# Patient Record
Sex: FEMALE | Race: WHITE | Employment: OTHER | URBAN - METROPOLITAN AREA
[De-identification: names, ages, dates, MRNs, and addresses within clinical notes are randomized per-mention and may not be internally consistent; named-entity substitution may affect disease eponyms.]

---

## 2017-01-15 ENCOUNTER — HOSPITAL ENCOUNTER (EMERGENCY)
Age: 82
Discharge: HOME | End: 2017-01-15
Attending: EMERGENCY MEDICINE
Payer: MEDICARE

## 2017-01-15 VITALS
DIASTOLIC BLOOD PRESSURE: 94 MMHG | RESPIRATION RATE: 15 BRPM | WEIGHT: 128 LBS | HEIGHT: 66 IN | HEART RATE: 78 BPM | SYSTOLIC BLOOD PRESSURE: 166 MMHG | TEMPERATURE: 98 F | BODY MASS INDEX: 20.57 KG/M2 | OXYGEN SATURATION: 94 %

## 2017-01-15 PROCEDURE — 99282 EMERGENCY DEPT VISIT SF MDM: CPT

## 2017-01-15 PROCEDURE — 90715 TDAP VACCINE 7 YRS/> IM: CPT | Performed by: PHYSICIAN ASSISTANT

## 2017-01-15 PROCEDURE — 74011250637 HC RX REV CODE- 250/637: Performed by: PHYSICIAN ASSISTANT

## 2017-01-15 PROCEDURE — 75810000293 HC SIMP/SUPERF WND  RPR

## 2017-01-15 PROCEDURE — 74011000250 HC RX REV CODE- 250: Performed by: PHYSICIAN ASSISTANT

## 2017-01-15 PROCEDURE — 77030002916 HC SUT ETHLN J&J -A

## 2017-01-15 PROCEDURE — 74011250636 HC RX REV CODE- 250/636: Performed by: PHYSICIAN ASSISTANT

## 2017-01-15 PROCEDURE — 90471 IMMUNIZATION ADMIN: CPT

## 2017-01-15 RX ORDER — LIDOCAINE HYDROCHLORIDE 20 MG/ML
10 INJECTION, SOLUTION EPIDURAL; INFILTRATION; INTRACAUDAL; PERINEURAL
Status: COMPLETED | OUTPATIENT
Start: 2017-01-15 | End: 2017-01-15

## 2017-01-15 RX ORDER — ERGOCALCIFEROL 1.25 MG/1
50000 CAPSULE ORAL
COMMUNITY
End: 2020-07-13

## 2017-01-15 RX ADMIN — BACITRACIN, NEOMYCIN, POLYMYXIN B 1 PACKET: 400; 3.5; 5 OINTMENT TOPICAL at 16:08

## 2017-01-15 RX ADMIN — LIDOCAINE HYDROCHLORIDE 200 MG: 20 INJECTION, SOLUTION EPIDURAL; INFILTRATION; INTRACAUDAL; PERINEURAL at 16:08

## 2017-01-15 RX ADMIN — TETANUS TOXOID, REDUCED DIPHTHERIA TOXOID AND ACELLULAR PERTUSSIS VACCINE, ADSORBED 0.5 ML: 5; 2.5; 8; 8; 2.5 SUSPENSION INTRAMUSCULAR at 16:08

## 2017-01-15 NOTE — DISCHARGE INSTRUCTIONS
We hope that we have addressed all of your medical concerns. The examination and treatment you received in the Emergency Department were for an emergent problem and were not intended as complete care. It is important that you follow up with your healthcare provider(s) for ongoing care. If your symptoms worsen or do not improve as expected, and you are unable to reach your usual health care provider(s), you should return to the Emergency Department. Today's healthcare is undergoing tremendous change, and patient satisfaction surveys are one of the many tools to assess the quality of medical care. You may receive a survey from the [employer] regarding your experience in the Emergency Department. I hope that your experience has been completely positive, particularly the medical care that I provided. As such, please participate in the survey; anything less than excellent does not meet my expectations or intentions. [address] [place] [address] and [facility] participate in nationally recognized quality of care measures. If your blood pressure is greater than 120/80, as reported below, we urge that you seek medical care to address the potential of high blood pressure, commonly known as hypertension. Hypertension can be hereditary or can be caused by certain medical conditions, pain, stress, or \"white coat syndrome. \"       Please make an appointment with your health care provider(s) for follow up of your Emergency Department visit. VITALS:   Patient Vitals for the past 8 hrs:   Temp Pulse Resp BP SpO2   01/15/17 1449 98 °F (36.7 °C) 78 15 (!) 166/94 94 %          Thank you for allowing us to provide you with medical care today. We realize that you have many choices for your emergency care needs. Please choose us in the future for any continued health care needs. Marilyn Patel  [address] [place] [address] [address]   Office: [phone]            No results found for this or any previous visit (from the past 24 hour(s)). No results found.

## 2017-01-15 NOTE — ED PROVIDER NOTES
HPI Comments: Patient presents with complaints of laceration to left lower leg after hitting the corner of the car door. Patient is unsure of her last tetanus vaccine. Patient is a 80 y.o. female presenting with skin laceration. The history is provided by the patient. Laceration    The incident occurred 1 to 2 hours ago. The laceration is located on the left leg. The laceration is 5 cm in size. The injury mechanism is a metal edge. Foreign body present: no. The pain is at a severity of 0/10. The pain is mild. The pain has been constant since onset. Pertinent negatives include no numbness, no tingling, no weakness, no loss of motion, no coolness and no discoloration. It is unknown when the patient last had a tetanus shot. Past Medical History:   Diagnosis Date    DDD (degenerative disc disease)     Duodenal ulcer 1976    FH: leukemia     Headache(784.0)     Hypercholesterolemia     Osteopenia     Osteoporosis     Rectal polyp     Subarachnoid hemorrhage ([address] 1983       Past Surgical History:   Procedure Laterality Date    Hx urological       bladder surgery    Hx hysterectomy       2/2 fibroids    Pr repair of rectocele      Hx cystocele repair      Hx mohs procedure      Hx orthopaedic       left foot surgery         Family History:   Problem Relation Age of Onset    Cancer Sister      leukemia    Cancer Brother      leukemia       Social History     Social History    Marital status:      Spouse name: N/A    Number of children: N/A    Years of education: N/A     Occupational History    Not on file.      Social History Main Topics    Smoking status: Former Smoker     Packs/day: 0.50     Years: 20.00     Quit date: 7/15/1976    Smokeless tobacco: Not on file    Alcohol use No    Drug use: Not on file    Sexual activity: Not on file     Other Topics Concern    Not on file     Social History Narrative         ALLERGIES: Review of patient's allergies indicates no known allergies. Review of Systems   Constitutional: Negative. HENT: Negative. Eyes: Negative. Respiratory: Negative. Cardiovascular: Negative. Gastrointestinal: Negative. Endocrine: Negative. Genitourinary: Negative. Musculoskeletal: Negative. Skin: Positive for wound. Allergic/Immunologic: Negative. Neurological: Negative. Negative for tingling, weakness and numbness. Hematological: Negative. Psychiatric/Behavioral: Negative. All other systems reviewed and are negative. Vitals:    01/15/17 1449   BP: (!) 166/94   Pulse: 78   Resp: 15   Temp: 98 °F (36.7 °C)   SpO2: 94%   Weight: 58.1 kg (128 lb)   Height: 5' 5.5\" (1.664 m)            Physical Exam   Constitutional: She is oriented to person, place, and time. She appears well-developed and well-nourished. HENT:   Head: Normocephalic and atraumatic. Right Ear: External ear normal.   Left Ear: External ear normal.   Mouth/Throat: Oropharynx is clear and moist. No oropharyngeal exudate. Eyes: Conjunctivae and EOM are normal. Pupils are equal, round, and reactive to light. Right eye exhibits no discharge. Left eye exhibits no discharge. No scleral icterus. Neck: Normal range of motion. No tracheal deviation present. No thyromegaly present. Cardiovascular: Normal rate, regular rhythm and normal heart sounds. No murmur heard. Pulmonary/Chest: Effort normal and breath sounds normal. No respiratory distress. She has no wheezes. She has no rales. She exhibits no tenderness. Abdominal: Soft. Bowel sounds are normal. She exhibits no distension. There is no tenderness. There is no rebound and no guarding. Musculoskeletal: Normal range of motion. She exhibits no edema or tenderness. Legs:  Lymphadenopathy:     She has no cervical adenopathy. Neurological: She is alert and oriented to person, place, and time. No cranial nerve deficit. Coordination normal.   Skin: Skin is warm. No erythema.    Psychiatric: She has a normal mood and affect. Her behavior is normal. Judgment and thought content normal.   Nursing note and vitals reviewed. MDM  Number of Diagnoses or Management Options  Laceration:   Diagnosis management comments: Assesment/Plan- wound cleaned and sutured. Dressing applied, tetanus updated. Patient discharged with follow up by PCP in 1 week for suture removal.       Amount and/or Complexity of Data Reviewed  Discuss the patient with other providers: yes (Attending- Dr. Cookie Owen)      ED Course       Wound Repair  Date/Time: 1/15/2017 5:08 PM  Performed by: Smita Bolton provider: Cookie Owen  Preparation: skin prepped with Shur-Clens  Pre-procedure re-eval: Immediately prior to the procedure, the patient was reevaluated and found suitable for the planned procedure and any planned medications. Time out: Immediately prior to the procedure a time out was called to verify the correct patient, procedure, equipment, staff and marking as appropriate. .  Location details: left leg  Wound length:2.6 - 7.5 cm  Anesthesia: local infiltration    Anesthesia:  Anesthesia: local infiltration  Local Anesthetic: lidocaine 2% without epinephrine   Anesthetic total: 5 mL  Foreign bodies: no foreign bodies  Irrigation solution: saline  Irrigation method: syringe  Debridement: none  Skin closure: 4-0 nylon  Number of sutures: 7  Technique: simple  Approximation: close  Dressing: 4x4 and antibiotic ointment  Patient tolerance: Patient tolerated the procedure well with no immediate complications  My total time at bedside, performing this procedure was 16-30 minutes.

## 2017-01-15 NOTE — ED TRIAGE NOTES
Patient arrived with c/o laceration to left shin after the corner of the car door scraped her leg. Bleeding controlled, applied large bandaid.

## 2017-01-24 ENCOUNTER — OFFICE VISIT (OUTPATIENT)
Dept: FAMILY MEDICINE CLINIC | Age: 82
End: 2017-01-24

## 2017-01-24 VITALS
WEIGHT: 132 LBS | SYSTOLIC BLOOD PRESSURE: 159 MMHG | BODY MASS INDEX: 21.21 KG/M2 | HEIGHT: 66 IN | HEART RATE: 72 BPM | DIASTOLIC BLOOD PRESSURE: 96 MMHG | RESPIRATION RATE: 20 BRPM | OXYGEN SATURATION: 97 % | TEMPERATURE: 97.4 F

## 2017-01-24 DIAGNOSIS — Z48.02 ENCOUNTER FOR REMOVAL OF SUTURES: Primary | ICD-10-CM

## 2017-01-24 NOTE — MR AVS SNAPSHOT
Visit Information Date & Time Provider Department Dept. Phone Encounter #  
 1/24/2017  2:00 PM Melody Lucas MD [address] [place], [place] [address] [id number] Follow-up Instructions Return in about 1 week (around 1/31/2017) for medicare wellness. Upcoming Health Maintenance Date Due ZOSTER VACCINE AGE 60> 12/8/1990 GLAUCOMA SCREENING Q2Y 12/8/1995 OSTEOPOROSIS SCREENING (DEXA) 12/8/1995 Pneumococcal 65+ Low/Medium Risk (1 of 2 - PCV13) 12/8/1995 MEDICARE YEARLY EXAM 12/8/1995 INFLUENZA AGE 9 TO ADULT 8/1/2016 DTaP/Tdap/Td series (2 - Td) 1/15/2027 Allergies as of 1/24/2017  Review Complete On: 1/24/2017 By: Reyna Collier No Known Allergies Current Immunizations  Reviewed on 8/12/2013 Name Date Influenza Vaccine Split 10/18/2010 TD Vaccine 11/7/2000 Td, Adsorbed PF 8/12/2013 Tdap 1/15/2017  4:08 PM  
  
 Not reviewed this visit Vitals BP Pulse Temp Resp Height(growth percentile) Weight(growth percentile) (!) 159/96 72 97.4 °F (36.3 °C) (Oral) 20 5' 5.5\" (1.664 m) 132 lb (59.9 kg) SpO2 BMI OB Status Smoking Status 97% 21.63 kg/m2 Hysterectomy Former Smoker Vitals History BMI and BSA Data Body Mass Index Body Surface Area  
 21.63 kg/m 2 1.66 m 2 Preferred Pharmacy Pharmacy Name Phone [facility] [address] [place] [address] [phone] Your Updated Medication List  
  
   
This list is accurate as of: 1/24/17  2:47 PM.  Always use your most recent med list.  
  
  
  
  
 timolol 0.5 % ophthalmic solution Commonly known as:  TIMOPTIC Administer 1 Drop to both eyes. traMADol 50 mg tablet Commonly known as:  ULTRAM  
Take 1/2 tablet BID for 3 days then one tablet bid. TYLENOL 325 mg tablet Generic drug:  acetaminophen Take 1 Tab by mouth two (2) times a day. VITAMIN D2 50,000 unit capsule Generic drug:  ergocalciferol Take 50,000 Units by mouth. Follow-up Instructions Return in about 1 week (around 1/31/2017) for medicare wellness. Patient Instructions Keep the steri strips on until they fall off in the shower. It's ok to get the area wet and wash it with soap and water. Come back to clinic if the area starts to get more red or starts draining fluid, especially if it's draining pus. Feel free to keep bandaids over it or use guaze and/or vaseline if that's more comfortable for you. Also make an appointment for medicare wellness. Introducing [facility]! Dear Emi Bond: Thank you for requesting a [employer] account. Our records indicate that you have previously registered for a [employer] account but its currently inactive. Please call our [employer] support line at [phone]. Additional Information If you have questions, please visit the Frequently Asked Questions section of the [employer] website at [email or web address] Remember, [employer] is NOT to be used for urgent needs. For medical emergencies, dial 911. Now available from your iPhone and Android! Please provide this summary of care documentation to your next provider. Your primary care clinician is listed as [address]. If you have any questions after today's visit, please call [phone].

## 2017-01-24 NOTE — PATIENT INSTRUCTIONS
Keep the steri strips on until they fall off in the shower. It's ok to get the area wet and wash it with soap and water. Come back to clinic if the area starts to get more red or starts draining fluid, especially if it's draining pus. Feel free to keep bandaids over it or use guaze and/or vaseline if that's more comfortable for you. Also make an appointment for medicare wellness.

## 2017-01-24 NOTE — PROGRESS NOTES
Health Maintenance Due   Topic Date Due    ZOSTER VACCINE AGE 60>  12/08/1990    GLAUCOMA SCREENING Q2Y  12/08/1995    OSTEOPOROSIS SCREENING (DEXA)  12/08/1995    Pneumococcal 65+ Low/Medium Risk (1 of 2 - PCV13) 12/08/1995    MEDICARE YEARLY EXAM  12/08/1995    INFLUENZA AGE 9 TO ADULT  08/01/2016

## 2017-01-25 NOTE — PROGRESS NOTES
Progress Note    Patient: Mike Pizano MRN: [id number]  SSN: [id number]    YOB: 1930  Age: 80 y.o. Sex: female        Chief Complaint   Patient presents with    Wound Check    Suture Removal         Subjective:     Patient presents for possible suture removal.  She was seen at [facility] ED 1/15/17 for a laceration to her anterior left shin that was due to striking her car door accidentally. She had 7 4-0 nylons thrown in interrupted fashion closing her wound. She has had no fevers. She has not noticed any drainage or pus. She has kept it covered most of the time. Under Meds tab in chart review, after unchecking 'current meds' one can see that dTaP given 1/15/17. Current and past medical information:    Current Medications after this visit[de-identified]     Current Outpatient Prescriptions   Medication Sig    ergocalciferol (VITAMIN D2) 50,000 unit capsule Take 50,000 Units by mouth.  traMADol (ULTRAM) 50 mg tablet Take 1/2 tablet BID for 3 days then one tablet bid.  acetaminophen (TYLENOL) 325 mg tablet Take 1 Tab by mouth two (2) times a day.  timolol (TIMOPTIC) 0.5 % ophthalmic solution Administer 1 Drop to both eyes. No current facility-administered medications for this visit.         Patient Active Problem List    Diagnosis Date Noted    Vitamin D deficiency 07/15/2010    Elevated blood pressure 07/15/2010    Insomnia 07/15/2010    Osteopenia     Hypercholesterolemia     Rectal polyp     Headache(784.0)     DDD (degenerative disc disease)     FH: leukemia     Duodenal ulcer - 1976     Subarachnoid hemorrhage - 1983        Past Medical History   Diagnosis Date    DDD (degenerative disc disease)     Duodenal ulcer 1976    FH: leukemia     Headache(784.0)     Hypercholesterolemia     Osteopenia     Osteoporosis     Rectal polyp     Subarachnoid hemorrhage ([facility] [address] 1983       No Known Allergies    Past Surgical History   Procedure Laterality Date    Hx urological       bladder surgery    Hx hysterectomy       2/2 fibroids    Pr repair of rectocele      Hx cystocele repair      Hx mohs procedure      Hx orthopaedic       left foot surgery       Social History     Social History    Marital status:      Spouse name: N/A    Number of children: N/A    Years of education: N/A     Social History Main Topics    Smoking status: Former Smoker     Packs/day: 0.50     Years: 20.00     Quit date: 7/15/1976    Smokeless tobacco: None    Alcohol use No    Drug use: None    Sexual activity: Not Asked     Other Topics Concern    None     Social History Narrative       Review of Systems   Constitutional: Negative for chills and fever. Objective:     Vitals:    01/24/17 1416   BP: (!) 159/96   Pulse: 72   Resp: 20   Temp: 97.4 °F (36.3 °C)   TempSrc: Oral   SpO2: 97%   Weight: 132 lb (59.9 kg)   Height: 5' 5.5\" (1.664 m)      Body mass index is 21.63 kg/(m^2). Physical Exam   Constitutional: She appears well-developed and well-nourished. HENT:   Head: Normocephalic. Eyes: Pupils are equal, round, and reactive to light. No scleral icterus. Cardiovascular: Normal rate and regular rhythm. Pulmonary/Chest: Effort normal. No respiratory distress. She has no wheezes. She has no rales. Abdominal: Soft. She exhibits no distension. There is no tenderness. Musculoskeletal:     Right shin: Jagged laceration with extensive crusted clot. 7 4-0 nylon sutures in place, not holding tension. Some surrounding erythema, not warm to touch at all. No drainage. Some areas in lac with skin well approximated, most have started healing via secondary intention with a very small epithelizlized gap, roughly . 3cm in width, between sutures. Sutures removed without any new tension on approximated skin in incision. Covered with steri strips and gauze/tape. Nursing note and vitals reviewed. Assessment and Plan:       ICD-10-CM ICD-9-CM    1.  Encounter for removal of sutures Z48.02 V58.32      Pt to come back if pus, drainage, redness, fever. Plan of care:  Discussed diagnoses in detail with patient. Medication risks/benefits/side effects discussed with patient. All of the patient's questions were addressed. The patient understands and agrees with our plan of care. The patient knows to call back if they are unsure of or forget any changes we discussed today or if the symptoms change. The patient received an After-Visit Summary which contains VS, orders, medication list and allergy list. This can be used as a \"mini-medical record\" should they have to seek medical care while out of town. Patient Care Team:  Humphrey Vences MD as PCP - [facility])    Follow-up Disposition:  Return in about 1 week (around 1/31/2017) for medicare wellness.     Future Appointments  Date Time Provider Brandon Bustamante   2/9/2017 9:40 AM Lexus Phillips MD [facility]   3/23/2017 3:10 PM Lexus Phillips MD [facility]       Signed By: Lexus Phillips MD     January 24, 2017

## 2017-01-27 ENCOUNTER — HOSPITAL ENCOUNTER (EMERGENCY)
Age: 82
Discharge: HOME | End: 2017-01-27
Attending: EMERGENCY MEDICINE
Payer: MEDICARE

## 2017-01-27 ENCOUNTER — APPOINTMENT (OUTPATIENT)
Dept: CT IMAGING | Age: 82
End: 2017-01-27
Attending: EMERGENCY MEDICINE
Payer: MEDICARE

## 2017-01-27 VITALS
SYSTOLIC BLOOD PRESSURE: 157 MMHG | OXYGEN SATURATION: 98 % | RESPIRATION RATE: 18 BRPM | BODY MASS INDEX: 21.99 KG/M2 | TEMPERATURE: 97.6 F | HEIGHT: 65 IN | DIASTOLIC BLOOD PRESSURE: 61 MMHG | WEIGHT: 132 LBS | HEART RATE: 73 BPM

## 2017-01-27 DIAGNOSIS — S09.90XA HEAD INJURY, INITIAL ENCOUNTER: ICD-10-CM

## 2017-01-27 DIAGNOSIS — S00.83XA FACIAL CONTUSION, INITIAL ENCOUNTER: Primary | ICD-10-CM

## 2017-01-27 PROCEDURE — 70486 CT MAXILLOFACIAL W/O DYE: CPT

## 2017-01-27 PROCEDURE — 99282 EMERGENCY DEPT VISIT SF MDM: CPT

## 2017-01-27 PROCEDURE — 70450 CT HEAD/BRAIN W/O DYE: CPT

## 2017-01-27 NOTE — ED TRIAGE NOTES
Pt states she tripped over something in the driveway and fell striking her left orbit. No LOC or neck pain. Pt also reports left shoulder is \"sore\". Abrasions to the right thumb, left wrist, left shoulder, left knee.  + bruising and swelling to the left orbit

## 2017-01-27 NOTE — DISCHARGE INSTRUCTIONS
Head Injury: Care Instructions  Your Care Instructions  Most injuries to the head are minor. Bumps, cuts, and scrapes on the head and face usually heal well and can be treated the same as injuries to other parts of the body. Although it's rare, once in a while a more serious problem shows up after you are home. So it's good to be on the lookout for symptoms for a day or two. Follow-up care is a key part of your treatment and safety. Be sure to make and go to all appointments, and call your doctor if you are having problems. It's also a good idea to know your test results and keep a list of the medicines you take. How can you care for yourself at home? · Follow your doctor's instructions. He or she will tell you if you need someone to watch you closely for the next 24 hours or longer. · Take it easy for the next few days or more if you are not feeling well. · Ask your doctor when it's okay for you to go back to activities like driving a car, riding a bike, or operating machinery. When should you call for help? Call 911 anytime you think you may need emergency care. For example, call if:  · You have a seizure. · You passed out (lost consciousness). · You are confused or can't stay awake. Call your doctor now or seek immediate medical care if:  · You have new or worse vomiting. · You feel less alert. · You have new weakness or numbness in any part of your body. Watch closely for changes in your health, and be sure to contact your doctor if:  · You do not get better as expected. · You have new symptoms, such as headaches, trouble concentrating, or changes in mood. Where can you learn more? Go to http://carlos-raheel.info/. Enter I676 in the search box to learn more about \"Head Injury: Care Instructions. \"  Current as of: September 7, 2016  Content Version: 11.1  © 9034-6896 [employer].  Care instructions adapted under license by [employer] (which disclaims liability or warranty for this information). If you have questions about a medical condition or this instruction, always ask your healthcare professional. [place] [address] any warranty or liability for your use of this information.

## 2017-01-27 NOTE — ED PROVIDER NOTES
HPI Comments: 80 y.o. female with past medical history significant for osteopenia, hypercholesterolemia, rectal polyp, duodenal ulcer, subarachnoid hemorrhage, headache, DDD, and osteoporosis who presents from home with chief complaint of facial pain. Per pt, 2-3 hours PTA she was walking to  mail when she suffered a GLF after tripping over a rock. The pt notes she landed directly on her face which resulted in left sided facial pain surrounding her left orbit. Following the fall, the pt reports noticing swelling around her left eye with a small bruise directly below her eye. The pt states she did not suffer LOC. The pt also reports experiencing pain to her left shoulder which she describes as a, \"soreness'. She further states she had small abrasions over her left hand and shoulder. The pt rates her current pain 5/10. Per pt, she has hx of multiple falls in the past.  There are no other acute medical concerns at this time. Social hx: Former smoker, No ETOH use  Significant FMHx: leukemia  PCP: Emiliano Carson MD    Note written by Maya Sidhu, as dictated by Cipriano Kay MD 5:21 PM        The history is provided by the patient.         Past Medical History:   Diagnosis Date    DDD (degenerative disc disease)     Duodenal ulcer 1976    FH: leukemia     Headache(784.0)     Hypercholesterolemia     Osteopenia     Osteoporosis     Rectal polyp     Subarachnoid hemorrhage ([facility] [address] 1983       Past Surgical History:   Procedure Laterality Date    Hx urological       bladder surgery    Hx hysterectomy       2/2 fibroids    Pr repair of rectocele      Hx cystocele repair      Hx mohs procedure      Hx orthopaedic       left foot surgery         Family History:   Problem Relation Age of Onset    Cancer Sister      leukemia    Cancer Brother      leukemia       Social History     Social History    Marital status:      Spouse name: N/A    Number of children: N/A    Years of education: N/A     Occupational History    Not on file. Social History Main Topics    Smoking status: Former Smoker     Packs/day: 0.50     Years: 20.00     Quit date: 7/15/1976    Smokeless tobacco: Not on file    Alcohol use No    Drug use: Not on file    Sexual activity: Not on file     Other Topics Concern    Not on file     Social History Narrative         ALLERGIES: Review of patient's allergies indicates no known allergies. Review of Systems   HENT: Positive for facial swelling (left sided facial swelling surrounding left orbital ). Musculoskeletal: Positive for arthralgias (left shoulder) and myalgias (pain to left side of face). Skin: Positive for wound (multiple small abrassions to right hand, left wrist and left shoulder). Vitals:    01/27/17 1709   BP: (!) 196/106   Pulse: 79   Resp: 20   Temp: 97.6 °F (36.4 °C)   SpO2: 96%   Weight: 59.9 kg (132 lb)   Height: 5' 5\" (1.651 m)            Physical Exam   Constitutional: She is oriented to person, place, and time. She appears well-developed and well-nourished. No distress. HENT:   Head: Normocephalic. Head is with contusion. Head is without abrasion. Mouth/Throat: Oropharynx is clear and moist.   Eyes: Conjunctivae and EOM are normal. Pupils are equal, round, and reactive to light. Neck: Normal range of motion. Neck supple. Cardiovascular: Normal rate, regular rhythm, normal heart sounds and intact distal pulses. No murmur heard. Pulmonary/Chest: Effort normal and breath sounds normal. No stridor. No respiratory distress. Abdominal: Soft. Bowel sounds are normal. There is no tenderness. Musculoskeletal: Normal range of motion. She exhibits no edema or tenderness. Neurological: She is alert and oriented to person, place, and time. No cranial nerve deficit. Skin: Skin is warm and dry. She is not diaphoretic. Psychiatric: She has a normal mood and affect. Nursing note and vitals reviewed.        [facility]  Number of Diagnoses or Management Options  Facial contusion, initial encounter:   Head injury, initial encounter:   Diagnosis management comments: Patient s/p GLF with facial trauma - hx of SDH in the past.  Check head CT and max/face CT       Amount and/or Complexity of Data Reviewed  Tests in the radiology section of CPT®: ordered and reviewed  Independent visualization of images, tracings, or specimens: yes      ED Course       Procedures    Final result (Exam End: 1/27/2017  6:14 PM) Open        Study Result      CLINICAL HISTORY: Trauma     INDICATION: Trauma     COMPARISON: 2013        CT dose reduction was achieved through use of a standardized protocol tailored  for this examination and automatic exposure control for dose modulation.         TECHNIQUE: Serial axial images with a collimation of 5 mm were obtained from the  skull base through the vertex      FINDINGS:     Mild sulcal and ventricular prominence. Minimal periventricular white matter  hypodensity and additional scattered hypodensities in the cerebral white  matter. . No abnormal mass is identified. There is no evidence of an acute  infarction, hemorrhage, or mass-effect. There is no evidence of midline shift or  hydrocephalus. Posterior fossa structures are unremarkable. No extra-axial  collections are seen.     Mastoid air cells and the visualized paranasal sinuses are well pneumatized and  clear. There is no evidence of depressed skull fractures of soft tissue  swelling.     IMPRESSION  IMPRESSION:      Stable examination.     There is no acute intracranial process.                EXAM: CT MAXILLOFACIAL WO CONT  Clinical history: Maxillofacial pain after trauma        INDICATION: Pain, max-facial; trauma     COMPARISON: None.     CONTRAST: None.     TECHNIQUE: Multislice helical CT of the facial bones was performed in the axial  plane without intravenous contrast administration. Coronal and sagittal  reformations were generated.  CT dose reduction was achieved through use of a  standardized protocol tailored for this examination and automatic exposure  control for dose modulation.      FINDINGS:     Minimal soft tissue edema overlying the left orbit. The visualized paranasal  sinuses and mastoid air cells are clear. There are degenerative changes in the upper cervical spine with disc  protrusion/osteophyte at C4-C5. The globes, optic nerves and extraocular muscles are normal.     No abnormalities are identified within the visualized portions of the brain or  nasopharynx.     IMPRESSION  IMPRESSION:      There is no evidence of acute fracture or dislocation.

## 2017-01-31 ENCOUNTER — PATIENT OUTREACH (OUTPATIENT)
Dept: FAMILY MEDICINE CLINIC | Age: 82
End: 2017-01-31

## 2017-01-31 NOTE — PROGRESS NOTES
[address] [facility] [place] [facility] ED 1/27/2017: GLF after tripping over a rock:past medical history significant for osteopenia, hypercholesterolemia, rectal polyp, duodenal ulcer, subarachnoid hemorrhage, headache, DDD, and osteoporosis . Last ED 1/15/2017: Hit leg on car door. Patient uninterested in assessment or appointment with PCP. Unable to reach patient by telephone but left a voice mail message requesting a call back. NN able to reach patient today. Please note assessment. .  Patient denied. Red Flags: Fever,chills,Nausea,Vomiting,Diarrhea, unable to take medications,pain,SOB,chest pain,unusual sensations,dizziness,weakness. Patient stated, \" I am fine. I walk three miles three times a week. \" Patient has appointment set for 2/9/2017 and refused sooner appointment. NN reviewed Red flags with patient and request she please seek immediate medical attention if :Red Flags: Fever,chills,Nausea,Vomiting,Diarrhea, unable to take medications,pain,SOB,chest pain,unusual sensations,difficulty processing information,dizziness,weakness,BFAST. Patient not interested in Fall precaution information. _____________________________________________        MDM:Jack Amador MD   Emergency Medicine     Number of Diagnoses or Management Options  Facial contusion, initial encounter:   Head injury, initial encounter:   Diagnosis management comments: Patient s/p GLF with facial trauma - hx of SDH in the past. Check head CT and max/face CT  CLINICAL HISTORY: Trauma      INDICATION: Trauma      COMPARISON: 2013          CT dose reduction was achieved through use of a standardized protocol tailored  for this examination and automatic exposure control for dose modulation.           TECHNIQUE: Serial axial images with a collimation of 5 mm were obtained from the  skull base through the vertex       FINDINGS:      Mild sulcal and ventricular prominence.  Minimal periventricular white matter  hypodensity and additional scattered hypodensities in the cerebral white  matter. . No abnormal mass is identified. There is no evidence of an acute  infarction, hemorrhage, or mass-effect. There is no evidence of midline shift or  hydrocephalus. Posterior fossa structures are unremarkable. No extra-axial  collections are seen.      Mastoid air cells and the visualized paranasal sinuses are well pneumatized and  clear. There is no evidence of depressed skull fractures of soft tissue  swelling.      IMPRESSION  IMPRESSION:       Stable examination.      There is no acute intracranial process.                    EXAM: CT MAXILLOFACIAL WO CONT  Clinical history: Maxillofacial pain after trauma          INDICATION: Pain, max-facial; trauma      COMPARISON: None.      CONTRAST: None.      TECHNIQUE: Multislice helical CT of the facial bones was performed in the axial  plane without intravenous contrast administration. Coronal and sagittal  reformations were generated. CT dose reduction was achieved through use of a  standardized protocol tailored for this examination and automatic exposure  control for dose modulation.       FINDINGS:      Minimal soft tissue edema overlying the left orbit. The visualized paranasal  sinuses and mastoid air cells are clear. There are degenerative changes in the upper cervical spine with disc  protrusion/osteophyte at C4-C5.   The globes, optic nerves and extraocular muscles are normal.      No abnormalities are identified within the visualized portions of the brain or  nasopharynx.      IMPRESSION  IMPRESSION:       There is no evidence of acute fracture or dislocation.

## 2017-02-09 ENCOUNTER — OFFICE VISIT (OUTPATIENT)
Dept: FAMILY MEDICINE CLINIC | Age: 82
End: 2017-02-09

## 2017-02-09 VITALS
HEART RATE: 74 BPM | DIASTOLIC BLOOD PRESSURE: 94 MMHG | TEMPERATURE: 96.3 F | WEIGHT: 130 LBS | SYSTOLIC BLOOD PRESSURE: 152 MMHG | HEIGHT: 65 IN | BODY MASS INDEX: 21.66 KG/M2 | RESPIRATION RATE: 16 BRPM | OXYGEN SATURATION: 95 %

## 2017-02-09 DIAGNOSIS — I10 ESSENTIAL (PRIMARY) HYPERTENSION: Primary | ICD-10-CM
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
## 2017-02-09 NOTE — PROGRESS NOTES
Reviewed record in preparation for visit and have necessary documentation  Pt did not bring medication to office visit for review  Information was given to pt on Advanced Directives, Living Will  opportunity was given for questions  Goals that were addressed and/or need to be completed during or after this appointment include   Health Maintenance Due   Topic Date Due    ZOSTER VACCINE AGE 60>  12/08/1990    GLAUCOMA SCREENING Q2Y  12/08/1995    OSTEOPOROSIS SCREENING (DEXA)  12/08/1995    Pneumococcal 65+ Low/Medium Risk (1 of 2 - PCV13) 12/08/1995    MEDICARE YEARLY EXAM  12/08/1995    INFLUENZA AGE 9 TO ADULT  08/01/2016     Declines all vaccines  Pt has a Scheduled eye exam 4-2017    Home BP monitor 170/107  Office BP monitor 171/89,79        office BP monitor sitting  Right arm 991/87,35  Standing right arm 158/98,76

## 2017-02-09 NOTE — PATIENT INSTRUCTIONS

## 2017-02-09 NOTE — MR AVS SNAPSHOT
Visit Information Date & Time Provider Department Dept. Phone Encounter #  
 2/9/2017  9:40 AM Fam Ayala MD [address] [facility] [id number] Follow-up Instructions Return for -Due for a Medicare Wellness Visit. Your Appointments 3/23/2017  3:10 PM  
Medicare Physical with Fam Ayala MD  
[address] [facility]) Appt Note: [facility] Wellness per Dr. Cammie Garvey [address] [address]  
[place] [address] [address] Upcoming Health Maintenance Date Due ZOSTER VACCINE AGE 60> 12/8/1990 GLAUCOMA SCREENING Q2Y 12/8/1995 OSTEOPOROSIS SCREENING (DEXA) 12/8/1995 Pneumococcal 65+ Low/Medium Risk (1 of 2 - PCV13) 12/8/1995 MEDICARE YEARLY EXAM 12/8/1995 INFLUENZA AGE 9 TO ADULT 8/1/2016 DTaP/Tdap/Td series (2 - Td) 1/15/2027 Allergies as of 2/9/2017  Review Complete On: 2/9/2017 By: Roberto Law LPN No Known Allergies Current Immunizations  Reviewed on 8/12/2013 Name Date Influenza Vaccine Split 10/18/2010 TD Vaccine 11/7/2000 Td, Adsorbed PF 8/12/2013 Tdap 1/15/2017  4:08 PM  
  
 Not reviewed this visit You Were Diagnosed With   
  
 Codes Comments Falls, initial encounter    -  Primary ICD-10-CM: W19. Karel Rivers ICD-9-CM: E888.9 Essential (primary) hypertension     ICD-10-CM: I10 
ICD-9-CM: 401.9 Vitals BP Pulse Temp Resp Height(growth percentile) Weight(growth percentile) (!) 152/94 (BP 1 Location: Left arm, BP Patient Position: Sitting) 74 96.3 °F (35.7 °C) (Oral) 16 5' 5\" (1.651 m) 130 lb (59 kg) SpO2 BMI OB Status Smoking Status 95% 21.63 kg/m2 Hysterectomy Former Smoker Vitals History BMI and BSA Data Body Mass Index Body Surface Area  
 21.63 kg/m 2 1.64 m 2 Preferred Pharmacy Pharmacy Name Phone [facility] [address] [address] [phone] Your Updated Medication List  
  
   
This list is accurate as of: 2/9/17 10:24 AM.  Always use your most recent med list.  
  
  
  
  
 timolol 0.5 % ophthalmic solution Commonly known as:  TIMOPTIC Administer 1 Drop to both eyes. traMADol 50 mg tablet Commonly known as:  ULTRAM  
Take 1/2 tablet BID for 3 days then one tablet bid. TYLENOL 325 mg tablet Generic drug:  acetaminophen Take 1 Tab by mouth two (2) times a day. VITAMIN D2 50,000 unit capsule Generic drug:  ergocalciferol Take 50,000 Units by mouth. We Performed the Following CBC W/O DIFF [73222 CPT(R)] METABOLIC PANEL, COMPREHENSIVE [46300 CPT(R)] TSH 3RD GENERATION [72872 CPT(R)] Follow-up Instructions Return for -Due for a Medicare Wellness Visit. Patient Instructions Preventing Falls: Care Instructions Your Care Instructions Getting around your home safely can be a challenge if you have injuries or health problems that make it easy for you to fall. Loose rugs and furniture in walkways are among the dangers for many older people who have problems walking or who have poor eyesight. People who have conditions such as arthritis, osteoporosis, or dementia also have to be careful not to fall. You can make your home safer with a few simple measures. Follow-up care is a key part of your treatment and safety. Be sure to make and go to all appointments, and call your doctor if you are having problems. It's also a good idea to know your test results and keep a list of the medicines you take. How can you care for yourself at home? Taking care of yourself · You may get dizzy if you do not drink enough water. To prevent dehydration, drink plenty of fluids, enough so that your urine is light yellow or clear like water.  Choose water and other caffeine-free clear liquids. If you have kidney, heart, or liver disease and have to limit fluids, talk with your doctor before you increase the amount of fluids you drink. · Exercise regularly to improve your strength, muscle tone, and balance. Walk if you can. Swimming may be a good choice if you cannot walk easily. · Have your vision and hearing checked each year or any time you notice a change. If you have trouble seeing and hearing, you might not be able to avoid objects and could lose your balance. · Know the side effects of the medicines you take. Ask your doctor or pharmacist whether the medicines you take can affect your balance. Sleeping pills or sedatives can affect your balance. · Limit the amount of alcohol you drink. Alcohol can impair your balance and other senses. · Ask your doctor whether calluses or corns on your feet need to be removed. If you wear loose-fitting shoes because of calluses or corns, you can lose your balance and fall. · Talk to your doctor if you have numbness in your feet. Preventing falls at home · Remove raised doorway thresholds, throw rugs, and clutter. Repair loose carpet or raised areas in the floor. · Move furniture and electrical cords to keep them out of walking paths. · Use nonskid floor wax, and wipe up spills right away, especially on ceramic tile floors. · If you use a walker or cane, put rubber tips on it. If you use crutches, clean the bottoms of them regularly with an abrasive pad, such as steel wool. · Keep your house well lit, especially Carrie Holley, and outside walkways. Use night-lights in areas such as hallways and bathrooms. Add extra light switches or use remote switches (such as switches that go on or off when you clap your hands) to make it easier to turn lights on if you have to get up during the night. · Install sturdy handrails on stairways. · Move items in your cabinets so that the things you use a lot are on the lower shelves (about waist level). · Keep a cordless phone and a flashlight with new batteries by your bed. If possible, put a phone in each of the main rooms of your house, or carry a cell phone in case you fall and cannot reach a phone. Or, you can wear a device around your neck or wrist. You push a button that sends a signal for help. · Wear low-heeled shoes that fit well and give your feet good support. Use footwear with nonskid soles. Check the heels and soles of your shoes for wear. Repair or replace worn heels or soles. · Do not wear socks without shoes on wood floors. · Walk on the grass when the sidewalks are slippery. If you live in an area that gets snow and ice in the winter, sprinkle salt on slippery steps and sidewalks. Preventing falls in the bath · Install grab bars and nonskid mats inside and outside your shower or tub and near the toilet and sinks. · Use shower chairs and bath benches. · Use a hand-held shower head that will allow you to sit while showering. · Get into a tub or shower by putting the weaker leg in first. Get out of a tub or shower with your strong side first. 
· Repair loose toilet seats and consider installing a raised toilet seat to make getting on and off the toilet easier. · Keep your bathroom door unlocked while you are in the shower. Where can you learn more? Go to http://carlos-raheel.info/. Enter [phone] [id number] [phone] in the search box to learn more about \"Preventing Falls: Care Instructions. \" Current as of: August 4, 2016 Content Version: 11.1 © 0602-9556 [employer]. Care instructions adapted under license by [employer] (which disclaims liability or warranty for this information). If you have questions about a medical condition or this instruction, always ask your healthcare professional. [facility] any warranty or liability for your use of this information. Please provide this summary of care documentation to your next provider. Your primary care clinician is listed as [address]. If you have any questions after today's visit, please call [phone].

## 2017-02-10 LAB
ALBUMIN SERPL-MCNC: 4.1 G/DL (ref 3.5–4.7)
ALBUMIN/GLOB SERPL: 1.5 {RATIO} (ref 1.1–2.5)
ALP SERPL-CCNC: 100 IU/L (ref 39–117)
ALT SERPL-CCNC: 14 IU/L (ref 0–32)
AST SERPL-CCNC: 19 IU/L (ref 0–40)
BILIRUB SERPL-MCNC: 0.4 MG/DL (ref 0–1.2)
BUN SERPL-MCNC: 22 MG/DL (ref 8–27)
BUN/CREAT SERPL: 28 (ref 11–26)
CALCIUM SERPL-MCNC: 9.3 MG/DL (ref 8.7–10.3)
CHLORIDE SERPL-SCNC: 95 MMOL/L (ref 96–106)
CO2 SERPL-SCNC: 31 MMOL/L (ref 18–29)
CREAT SERPL-MCNC: 0.8 MG/DL (ref 0.57–1)
ERYTHROCYTE [DISTWIDTH] IN BLOOD BY AUTOMATED COUNT: 13.2 % (ref 12.3–15.4)
GLOBULIN SER CALC-MCNC: 2.8 G/DL (ref 1.5–4.5)
GLUCOSE SERPL-MCNC: 85 MG/DL (ref 65–99)
HCT VFR BLD AUTO: 42.4 % (ref 34–46.6)
HGB BLD-MCNC: 14.3 G/DL (ref 11.1–15.9)
MCH RBC QN AUTO: 31.4 PG (ref 26.6–33)
MCHC RBC AUTO-ENTMCNC: 33.7 G/DL (ref 31.5–35.7)
MCV RBC AUTO: 93 FL (ref 79–97)
PLATELET # BLD AUTO: 217 X10E3/UL (ref 150–379)
POTASSIUM SERPL-SCNC: 4.5 MMOL/L (ref 3.5–5.2)
PROT SERPL-MCNC: 6.9 G/DL (ref 6–8.5)
RBC # BLD AUTO: 4.56 X10E6/UL (ref 3.77–5.28)
SODIUM SERPL-SCNC: 139 MMOL/L (ref 134–144)
TSH SERPL DL<=0.005 MIU/L-ACNC: 2.34 UIU/ML (ref 0.45–4.5)
WBC # BLD AUTO: 7.3 X10E3/UL (ref 3.4–10.8)

## 2017-02-11 NOTE — PROGRESS NOTES
Progress Note    Patient: Mirna Patino MRN: [id number]  SSN: [id number]    YOB: 1930  Age: 80 y.o. Sex: female      CC: \"I fell once and I hit my foot once. \"    Subjective:     Patient  is s/p ER visit from [facility] 1/27 for a fall. Head CT performed, which was negative. She had no labs performed. She is interested in getting labs today for routine purposes, but doesnt necessarily think she has an issue with falls despite being counseled by ER to f/u w PCP. She has not fallen since then 27th. She does describe that fall as mechanical.  She had stubbed her toe on something and just tripped. She has no no neurological complaints. She has not had any CP, dypsnea, tremors, dysuria. She feels he mental status is perfect at that is time, no issues with memory. She has been walking 3 miles daily for many years and does just fine with that. Foot injury described in previous visit for suture removal a few days prior to recent fall. See note for details. Patient has been having elevated BPs in office. She has a cuff at home and takes it every day. She says she is usually 120-130/80s. Current and past medical information:    Current Medications after this visit[de-identified]   Current Outpatient Prescriptions   Medication Sig    ergocalciferol (VITAMIN D2) 50,000 unit capsule Take 50,000 Units by mouth.  timolol (TIMOPTIC) 0.5 % ophthalmic solution Administer 1 Drop to both eyes.  traMADol (ULTRAM) 50 mg tablet Take 1/2 tablet BID for 3 days then one tablet bid.  acetaminophen (TYLENOL) 325 mg tablet Take 1 Tab by mouth two (2) times a day. No current facility-administered medications for this visit.         Patient Active Problem List    Diagnosis Date Noted    Vitamin D deficiency 07/15/2010    Elevated blood pressure 07/15/2010    Insomnia 07/15/2010    Osteopenia     Hypercholesterolemia     Rectal polyp     Headache(784.0)     DDD (degenerative disc disease)     FH: leukemia  Duodenal ulcer - 1976     Subarachnoid hemorrhage - 1983        Past Medical History   Diagnosis Date    DDD (degenerative disc disease)     Duodenal ulcer 1976    FH: leukemia     Headache(784.0)     Hypercholesterolemia     Osteopenia     Osteoporosis     Rectal polyp     Subarachnoid hemorrhage ([address] 1983       No Known Allergies    Past Surgical History   Procedure Laterality Date    Hx urological       bladder surgery    Hx hysterectomy       2/2 fibroids    Pr repair of rectocele      Hx cystocele repair      Hx mohs procedure      Hx orthopaedic       left foot surgery       Social History     Social History    Marital status:      Spouse name: N/A    Number of children: N/A    Years of education: N/A     Social History Main Topics    Smoking status: Former Smoker     Packs/day: 0.50     Years: 20.00     Quit date: 7/15/1976    Smokeless tobacco: None    Alcohol use No    Drug use: None    Sexual activity: Not Asked     Other Topics Concern    None     Social History Narrative       Review of Systems   Constitutional: Negative for chills, fever and weight loss. HENT: Negative for sore throat. Eyes: Negative for blurred vision and double vision. Respiratory: Negative for cough, hemoptysis and wheezing. Cardiovascular: Negative for chest pain, orthopnea and leg swelling. Gastrointestinal: Negative for abdominal pain, diarrhea, heartburn, nausea and vomiting. Genitourinary: Negative for dysuria, frequency and urgency. Musculoskeletal: Negative for myalgias. Skin: Negative for itching and rash. Neurological: Negative for dizziness, seizures and headaches. Endo/Heme/Allergies: Negative for polydipsia. Does not bruise/bleed easily. Psychiatric/Behavioral: Negative for depression and suicidal ideas.          Objective:     Vitals:    02/09/17 0945 02/09/17 0952   BP: 151/90 (!) 152/94   Pulse: 74    Resp: 16    Temp: 96.3 °F (35.7 °C)    TempSrc: Oral SpO2: 95%    Weight: 130 lb (59 kg)    Height: 5' 5\" (1.651 m)       Body mass index is 21.63 kg/(m^2). Patient home cuff compared to current and is equal.  Home cuff reviewed for last several values. Her last several values at home are as reported, 109-130/75-85. Physical Exam   Constitutional: She is oriented to person, place, and time. No distress. Appears younger than stated age   HENT:   Head: Normocephalic. There is ecchymosis and raccoon signs around right orbit   Eyes: Pupils are equal, round, and reactive to light. Right eye exhibits no discharge. Left eye exhibits no discharge. No scleral icterus. Cardiovascular: Normal rate and regular rhythm. Exam reveals no gallop and no friction rub. No murmur heard. Pulmonary/Chest: Effort normal. No respiratory distress. She has no wheezes. She has no rales. Abdominal: Soft. She exhibits no distension. There is no tenderness. Musculoskeletal: She exhibits no edema or tenderness. Lymphadenopathy:     She has no cervical adenopathy. Neurological: She is alert and oriented to person, place, and time. Gait is observed and normal.  CN II-XII normal   Skin: Skin is warm and dry. No rash noted. She is not diaphoretic. Psychiatric: She has a normal mood and affect. Her behavior is normal.   Nursing note and vitals reviewed. Assessment and Plan:       ICD-10-CM ICD-9-CM    1. Falls, initial encounter W19. Lauren Wahl B993.6 METABOLIC PANEL, COMPREHENSIVE      CBC W/O DIFF      TSH 3RD GENERATION   2. Essential (primary) hypertension  I10 401.9 TSH 3RD GENERATION     Patient has labile pressures. Unsure if starting BP med would be more benefit than harm. Considering low dose labetalol if she documents some additional elevated pressures at home. New guidelines suggest 150/90 may be acceptable for elderly. Will work on home safety eval    Plan of care:  Discussed diagnoses in detail with patient.      Medication risks/benefits/side effects discussed with patient. All of the patient's questions were addressed. The patient understands and agrees with our plan of care. The patient knows to call back if they are unsure of or forget any changes we discussed today or if the symptoms change. The patient received an After-Visit Summary which contains VS, orders, medication list and allergy list. This can be used as a \"mini-medical record\" should they have to seek medical care while out of town. Patient Care Team:  Juma Carreon MD as PCP - General (Family Practice)  Fam Mayorga, RUDI as Ambulatory Care Navigator    Follow-up Disposition:  Return for -Due for a Medicare Wellness Visit.     Future Appointments  Date Time Provider Brandon Bustamante   3/21/2017 3:10 PM Chelly Alvarez MD [facility]       Signed By: Chelly Alvarez MD     February 9, 2017       Seen with Dr. Tiffany Lopez

## 2017-02-14 NOTE — PROGRESS NOTES
I saw and evaluated the patient idependently, performing the key elements of the exam and service. I discussed the findings, assessment and plan with the resident and agree with the resident's findings and plan as documented in the resident's note. Darlene Santacruz M.D.

## 2017-03-15 ENCOUNTER — TELEPHONE (OUTPATIENT)
Dept: FAMILY MEDICINE CLINIC | Age: 82
End: 2017-03-15

## 2017-03-15 NOTE — TELEPHONE ENCOUNTER
Patient was seen in Feb she was under the impression that her Chlosterol would be check when she did Blood work  When she got results that wasn't on there  Patient would like to know why that wasn't checked    And if you could order it now for her to do so  Thanks    [facility]

## 2017-03-16 DIAGNOSIS — I10 ESSENTIAL HYPERTENSION: Primary | ICD-10-CM

## 2017-03-16 NOTE — TELEPHONE ENCOUNTER
Spoke with pt, she verbalized understanding to have her lab done. She stated that she will come in some time next week. Placed order for cholesterol.  She can come to the lab 8-12 or 1-4:30 to get it checked, JOVANNY

## 2017-04-19 LAB
CHOLEST SERPL-MCNC: 204 MG/DL (ref 100–199)
HDLC SERPL-MCNC: 63 MG/DL
LDLC SERPL CALC-MCNC: 120 MG/DL (ref 0–99)
TRIGL SERPL-MCNC: 106 MG/DL (ref 0–149)
VLDLC SERPL CALC-MCNC: 21 MG/DL (ref 5–40)

## 2019-12-07 ENCOUNTER — HOSPITAL ENCOUNTER (OUTPATIENT)
Dept: MRI IMAGING | Age: 84
Discharge: HOME | End: 2019-12-07
Attending: PHYSICAL MEDICINE & REHABILITATION

## 2019-12-07 DIAGNOSIS — M54.50 LUMBAR PAIN: ICD-10-CM

## 2019-12-07 DIAGNOSIS — M47.817 LUMBOSACRAL SPONDYLOSIS WITHOUT MYELOPATHY: ICD-10-CM

## 2019-12-10 ENCOUNTER — HOSPITAL ENCOUNTER (OUTPATIENT)
Dept: MRI IMAGING | Age: 84
Discharge: HOME | End: 2019-12-10
Attending: PHYSICAL MEDICINE & REHABILITATION
Payer: MEDICARE

## 2019-12-10 PROCEDURE — 72148 MRI LUMBAR SPINE W/O DYE: CPT

## 2020-07-10 ENCOUNTER — TELEPHONE (OUTPATIENT)
Dept: FAMILY MEDICINE CLINIC | Age: 85
End: 2020-07-10

## 2020-07-10 NOTE — TELEPHONE ENCOUNTER
Returned phone call to patient, no answer. She will need to make an appointment to have the forms filled out. Okay to schedule an in office appointment.

## 2020-07-10 NOTE — TELEPHONE ENCOUNTER
----- Message from Shavon Valverde sent at 7/10/2020  2:30 PM EDT -----  Regarding: Dr. Maribel Louie  Pt is having eye surgery on next Wednesday and would like to know can she bring the forms to be filled out for Preop , pls contact pt at  pls contact pt so she can make arrangements to come to office.

## 2020-07-13 ENCOUNTER — OFFICE VISIT (OUTPATIENT)
Dept: FAMILY MEDICINE CLINIC | Age: 85
End: 2020-07-13

## 2020-07-13 VITALS
OXYGEN SATURATION: 94 % | WEIGHT: 124 LBS | HEIGHT: 65 IN | DIASTOLIC BLOOD PRESSURE: 90 MMHG | RESPIRATION RATE: 20 BRPM | SYSTOLIC BLOOD PRESSURE: 151 MMHG | TEMPERATURE: 98.1 F | HEART RATE: 80 BPM | BODY MASS INDEX: 20.66 KG/M2

## 2020-07-13 DIAGNOSIS — R03.0 WHITE COAT SYNDROME WITHOUT DIAGNOSIS OF HYPERTENSION: ICD-10-CM

## 2020-07-13 DIAGNOSIS — H40.9 GLAUCOMA OF BOTH EYES, UNSPECIFIED GLAUCOMA TYPE: Primary | ICD-10-CM

## 2020-07-13 RX ORDER — CHOLECALCIFEROL (VITAMIN D3) 125 MCG
1 CAPSULE ORAL DAILY
COMMUNITY

## 2020-07-13 NOTE — PROGRESS NOTES
Thom Bingham is a 80 y.o. female who present for pre-operative evaluation. Thom Bingham was referred for evaluation by:Dr. Judith Miller for Pre- Op Evaluation. Surgeon:  Juan Francisco Hernandez  Surgery:  Glaucoma Drainage Implant  Anesthesia type: Regional Anesthesia  Indication:  Glaucoma  Date of Surgery: 7/15/20    Signs and symptoms:  Losing sight in the left eye  Duration:  Chronic  Context:  Glaucoma  Location:  Left eye  Quality:  Vision change  Severity:  Worsening/Progressive  Timing:  Insidious onset  Modifying factors:  Eye drops not effective  Allergies: No Known Allergies  Latex allergy: no    Surgical risk:  Low  Low:  Cataract, breast, dental, endoscopy  Intermediate:  Orthopedic, abdominal, thoracic, carotid endarterctomy, prostate  High:  Vascular, aortic, emergent, high risk of blood loss    Patient risks:    Age:  80 y.o. Abnormal EKG:  No  Obesity:  no, Body mass index is 20.63 kg/m². CAD:  no  MI < 6 weeks:  no  Chest pain or exertional dyspnea:  no  Heart rhythm problems:  no  Syncope:  no  Heart valve problems:  no  CHF:  no    Diagnosed diabetes:  no  H/o CVA:  no  kidney disease:  no  Screening for ETOH use:  Done and low risk  Smoking status:  Nonsmoker (Former)    Functional assessment:   can walk 2 blocks and up a flight of steps carrying groceries  Low functional capacity (< 4 METS):  no    Personal or FH of bleeding problems:  no  Personal or FH of blood clots:  no  Personal or FH of anesthesia problems:  no    Pulmonary Risk:  Asthma or COPD:  no  Obesity:  Body mass index is 20.63 kg/m².   Surgery close to diaphragm:  no  Known JESUS:  no  Albumin normal, BUN normal  Must quit smoking > 8 weeks pre-op        Past Medical History:   Diagnosis Date    DDD (degenerative disc disease)     Duodenal ulcer 1976    FH: leukemia     Headache(784.0)     Hypercholesterolemia     Osteopenia     Osteoporosis     Rectal polyp     [address]ar[address] [address] 1983     Past Surgical History: Procedure Laterality Date    HX CYSTOCELE REPAIR      HX HYSTERECTOMY      2/2 fibroids    HX MOHS PROCEDURES      HX ORTHOPAEDIC      left foot surgery    HX UROLOGICAL      bladder surgery    REPAIR OF RECTOCELE         Medications:  Current Outpatient Medications   Medication Sig Dispense Refill    cholecalciferol, vitamin D3, (Vitamin D3) 50 mcg (2,000 unit) tab Take  by mouth.  timolol (TIMOPTIC) 0.5 % ophthalmic solution Administer 1 Drop to both eyes. Allergies:  No Known Allergies    LMP:  No LMP recorded. Patient has had a hysterectomy.     Social History     Socioeconomic History    Marital status:      Spouse name: Not on file    Number of children: Not on file    Years of education: Not on file    Highest education level: Not on file   Occupational History    Not on file   Social Needs    Financial resource strain: Not on file    Food insecurity     Worry: Not on file     Inability: Not on file    Transportation needs     Medical: Not on file     Non-medical: Not on file   Tobacco Use    Smoking status: Former Smoker     Packs/day: 0.50     Years: 20.00     Pack years: 10.00     Last attempt to quit: 7/15/1976     Years since quittin.0   Substance and Sexual Activity    Alcohol use: No    Drug use: Not on file    Sexual activity: Not on file   Lifestyle    Physical activity     Days per week: Not on file     Minutes per session: Not on file    Stress: Not on file   Relationships    Social connections     Talks on phone: Not on file     Gets together: Not on file     Attends Yazdanism service: Not on file     Active member of club or organization: Not on file     Attends meetings of clubs or organizations: Not on file     Relationship status: Not on file    Intimate partner violence     Fear of current or ex partner: Not on file     Emotionally abused: Not on file     Physically abused: Not on file     Forced sexual activity: Not on file   Other Topics Concern    Not on file   Social History Narrative    Not on file       Family History   Problem Relation Age of Onset    Cancer Sister         leukemia    Cancer Brother         leukemia         Consider EST if   -any cardiac symptoms  -PTCA < 6 months OR > 5 years ago    (NO EST if normal EST < 2 years, CABG and NO SX , 5 years, PTCA and NO SX 6 mo to 5 years)    Coumadin   high risk= mechanical heart valve, VTE with hypercoag state, VTE < 3 months  Low risk= AF w/o CVA, h/o DVT > 3 months and NO hypercoag state  high risk:  Bridge with Lovenox  Low risk:  Stop 5 days prior to surgery    Revised Cardiac Risk Index Score: one point for each  High-risk surgery  CAD  CVD  Renal insufficiency  DM    If RCRS > 2 provide beta blockade    ROS:  Headaches:  None  Chest Pain:  None  SOB:  None  Fevers:  None  Other significant ROS:        Physical Exam  Visit Vitals  /90   Pulse 80   Temp 98.1 °F (36.7 °C) (Oral)   Resp 20   Ht 5' 5\" (1.651 m)   Wt 124 lb (56.2 kg)   SpO2 94%   BMI 20.63 kg/m²     Physical Exam  Vitals signs and nursing note reviewed. Constitutional:       Appearance: Normal appearance. HENT:      Head: Normocephalic and atraumatic. Nose: Nose normal.      Mouth/Throat:      Mouth: Mucous membranes are moist.      Pharynx: Oropharynx is clear. Eyes:      Extraocular Movements: Extraocular movements intact. Conjunctiva/sclera: Conjunctivae normal.      Pupils: Pupils are equal, round, and reactive to light. Neck:      Musculoskeletal: Normal range of motion. Cardiovascular:      Rate and Rhythm: Normal rate and regular rhythm. Pulses: Normal pulses. Heart sounds: Normal heart sounds. No murmur. No friction rub. No gallop. Pulmonary:      Effort: Pulmonary effort is normal.      Breath sounds: Normal breath sounds. Abdominal:      General: Abdomen is flat. Bowel sounds are normal.      Palpations: Abdomen is soft. Neurological:      Mental Status: She is alert. Lab Results:  No results found for this or any previous visit (from the past 24 hour(s)). EKG Results     None            EKG:    Indication:  Male > 39, female > 50, one cardiac risk factor    HGB:  If risk of bleeding    Glucose > age 39 or DM    BHCG in females    Assessment and Plan:    ICD-10-CM ICD-9-CM    1. Glaucoma of both eyes, unspecified glaucoma type  H40.9 365.9    2. White coat syndrome without diagnosis of hypertension  R03.0 796.2        Assessment:  Patient is stable and low risk for low risk procedure. Diagnoses and all orders for this visit:    1. Glaucoma of both eyes, unspecified glaucoma type    2.  White coat syndrome without diagnosis of hypertension            MD CYNDEE Brock [facility]  07/13/20

## 2020-07-13 NOTE — PROGRESS NOTES
1. Have you been to the ER, urgent care clinic since your last visit? Hospitalized since your last visit? No    2. Have you seen or consulted any other health care providers outside of the [address] [place] [address] since your last visit? Include any pap smears or colon screening. No    Reviewed record in preparation for visit and have necessary documentation  Goals that were addressed and/or need to be completed during or after this appointment include     Health Maintenance Due   Topic Date Due    Shingrix Vaccine Age 49> (1 of 2) 12/08/1980    Bone Densitometry (Dexa) Screening  12/08/1995    Pneumococcal 65+ years (1 of 1 - PPSV23) 12/08/1995    Medicare Yearly Exam  03/14/2018    GLAUCOMA SCREENING Q2Y  05/16/2020       Patient is accompanied by self I have received verbal consent from Tc Acosta to discuss any/all medical information while they are present in the room.

## 2020-09-01 ENCOUNTER — HOSPITAL ENCOUNTER (OUTPATIENT)
Age: 85
Setting detail: OBSERVATION
Discharge: HOME | End: 2020-09-02
Attending: STUDENT IN AN ORGANIZED HEALTH CARE EDUCATION/TRAINING PROGRAM | Admitting: FAMILY MEDICINE
Payer: MEDICARE

## 2020-09-01 ENCOUNTER — APPOINTMENT (OUTPATIENT)
Dept: CT IMAGING | Age: 85
End: 2020-09-01
Attending: PHYSICIAN ASSISTANT
Payer: MEDICARE

## 2020-09-01 ENCOUNTER — APPOINTMENT (OUTPATIENT)
Dept: CT IMAGING | Age: 85
End: 2020-09-01
Attending: STUDENT IN AN ORGANIZED HEALTH CARE EDUCATION/TRAINING PROGRAM
Payer: MEDICARE

## 2020-09-01 ENCOUNTER — HOSPITAL ENCOUNTER (OUTPATIENT)
Dept: MRI IMAGING | Age: 85
Discharge: HOME | End: 2020-09-01
Attending: STUDENT IN AN ORGANIZED HEALTH CARE EDUCATION/TRAINING PROGRAM
Payer: MEDICARE

## 2020-09-01 DIAGNOSIS — R03.0 ELEVATED BLOOD PRESSURE READING: ICD-10-CM

## 2020-09-01 DIAGNOSIS — E78.00 HYPERCHOLESTEROLEMIA: Chronic | ICD-10-CM

## 2020-09-01 DIAGNOSIS — R42 DIZZINESS: Primary | ICD-10-CM

## 2020-09-01 DIAGNOSIS — I16.0 HYPERTENSIVE URGENCY: ICD-10-CM

## 2020-09-01 PROBLEM — R09.89 SUSPECTED CEREBROVASCULAR ACCIDENT: Status: ACTIVE | Noted: 2020-09-01

## 2020-09-01 LAB
ALBUMIN SERPL-MCNC: 3.6 G/DL (ref 3.5–5)
ALBUMIN/GLOB SERPL: 0.8 {RATIO} (ref 1.1–2.2)
ALP SERPL-CCNC: 106 U/L (ref 45–117)
ALT SERPL-CCNC: 18 U/L (ref 12–78)
ANION GAP SERPL CALC-SCNC: 8 MMOL/L (ref 5–15)
APPEARANCE UR: CLEAR
AST SERPL-CCNC: 20 U/L (ref 15–37)
ATRIAL RATE: 90 BPM
BACTERIA URNS QL MICRO: NEGATIVE /HPF
BASOPHILS # BLD: 0 K/UL (ref 0–0.1)
BASOPHILS NFR BLD: 0 % (ref 0–1)
BILIRUB SERPL-MCNC: 0.6 MG/DL (ref 0.2–1)
BILIRUB UR QL: NEGATIVE
BUN SERPL-MCNC: 22 MG/DL (ref 6–20)
BUN/CREAT SERPL: 28 (ref 12–20)
CALCIUM SERPL-MCNC: 9.1 MG/DL (ref 8.5–10.1)
CALCULATED P AXIS, ECG09: 64 DEGREES
CALCULATED R AXIS, ECG10: 32 DEGREES
CALCULATED T AXIS, ECG11: 68 DEGREES
CHLORIDE SERPL-SCNC: 98 MMOL/L (ref 97–108)
CO2 SERPL-SCNC: 29 MMOL/L (ref 21–32)
COLOR UR: ABNORMAL
COMMENT, HOLDF: NORMAL
CREAT SERPL-MCNC: 0.8 MG/DL (ref 0.55–1.02)
DIAGNOSIS, 93000: NORMAL
DIFFERENTIAL METHOD BLD: ABNORMAL
EOSINOPHIL # BLD: 0.2 K/UL (ref 0–0.4)
EOSINOPHIL NFR BLD: 2 % (ref 0–7)
EPITH CASTS URNS QL MICRO: ABNORMAL /LPF
ERYTHROCYTE [DISTWIDTH] IN BLOOD BY AUTOMATED COUNT: 13.4 % (ref 11.5–14.5)
GLOBULIN SER CALC-MCNC: 4.5 G/DL (ref 2–4)
GLUCOSE SERPL-MCNC: 92 MG/DL (ref 65–100)
GLUCOSE UR STRIP.AUTO-MCNC: NEGATIVE MG/DL
HCT VFR BLD AUTO: 47.2 % (ref 35–47)
HGB BLD-MCNC: 15 G/DL (ref 11.5–16)
HGB UR QL STRIP: ABNORMAL
HYALINE CASTS URNS QL MICRO: ABNORMAL /LPF (ref 0–5)
IMM GRANULOCYTES # BLD AUTO: 0 K/UL (ref 0–0.04)
IMM GRANULOCYTES NFR BLD AUTO: 0 % (ref 0–0.5)
INR PPP: 1 (ref 0.9–1.1)
KETONES UR QL STRIP.AUTO: NEGATIVE MG/DL
LEUKOCYTE ESTERASE UR QL STRIP.AUTO: ABNORMAL
LYMPHOCYTES # BLD: 1.7 K/UL (ref 0.8–3.5)
LYMPHOCYTES NFR BLD: 18 % (ref 12–49)
MAGNESIUM SERPL-MCNC: 2.4 MG/DL (ref 1.6–2.4)
MCH RBC QN AUTO: 30 PG (ref 26–34)
MCHC RBC AUTO-ENTMCNC: 31.8 G/DL (ref 30–36.5)
MCV RBC AUTO: 94.4 FL (ref 80–99)
MONOCYTES # BLD: 1 K/UL (ref 0–1)
MONOCYTES NFR BLD: 11 % (ref 5–13)
NEUTS SEG # BLD: 6.7 K/UL (ref 1.8–8)
NEUTS SEG NFR BLD: 69 % (ref 32–75)
NITRITE UR QL STRIP.AUTO: NEGATIVE
NRBC # BLD: 0 K/UL (ref 0–0.01)
NRBC BLD-RTO: 0 PER 100 WBC
P-R INTERVAL, ECG05: 168 MS
PH UR STRIP: 7.5 [PH] (ref 5–8)
PHOSPHATE SERPL-MCNC: 3.4 MG/DL (ref 2.6–4.7)
PLATELET # BLD AUTO: 228 K/UL (ref 150–400)
PMV BLD AUTO: 10.5 FL (ref 8.9–12.9)
POTASSIUM SERPL-SCNC: 3.6 MMOL/L (ref 3.5–5.1)
PROT SERPL-MCNC: 8.1 G/DL (ref 6.4–8.2)
PROT UR STRIP-MCNC: 30 MG/DL
PROTHROMBIN TIME: 10.5 SEC (ref 9–11.1)
Q-T INTERVAL, ECG07: 358 MS
QRS DURATION, ECG06: 84 MS
QTC CALCULATION (BEZET), ECG08: 437 MS
RBC # BLD AUTO: 5 M/UL (ref 3.8–5.2)
RBC #/AREA URNS HPF: ABNORMAL /HPF (ref 0–5)
SAMPLES BEING HELD,HOLD: NORMAL
SODIUM SERPL-SCNC: 135 MMOL/L (ref 136–145)
SP GR UR REFRACTOMETRY: 1.01 (ref 1–1.03)
TROPONIN I SERPL-MCNC: <0.05 NG/ML
TROPONIN I SERPL-MCNC: <0.05 NG/ML
TSH SERPL DL<=0.05 MIU/L-ACNC: 1.6 UIU/ML (ref 0.36–3.74)
UR CULT HOLD, URHOLD: NORMAL
UROBILINOGEN UR QL STRIP.AUTO: 0.2 EU/DL (ref 0.2–1)
VENTRICULAR RATE, ECG03: 90 BPM
WBC # BLD AUTO: 9.7 K/UL (ref 3.6–11)
WBC URNS QL MICRO: ABNORMAL /HPF (ref 0–4)

## 2020-09-01 PROCEDURE — 84100 ASSAY OF PHOSPHORUS: CPT

## 2020-09-01 PROCEDURE — 85025 COMPLETE CBC W/AUTO DIFF WBC: CPT

## 2020-09-01 PROCEDURE — 84443 ASSAY THYROID STIM HORMONE: CPT

## 2020-09-01 PROCEDURE — A9575 INJ GADOTERATE MEGLUMI 0.1ML: HCPCS | Performed by: RADIOLOGY

## 2020-09-01 PROCEDURE — 99285 EMERGENCY DEPT VISIT HI MDM: CPT

## 2020-09-01 PROCEDURE — 74011250637 HC RX REV CODE- 250/637: Performed by: STUDENT IN AN ORGANIZED HEALTH CARE EDUCATION/TRAINING PROGRAM

## 2020-09-01 PROCEDURE — 65390000012 HC CONDITION CODE 44 OBSERVATION

## 2020-09-01 PROCEDURE — 74011000636 HC RX REV CODE- 636: Performed by: RADIOLOGY

## 2020-09-01 PROCEDURE — 80053 COMPREHEN METABOLIC PANEL: CPT

## 2020-09-01 PROCEDURE — 84484 ASSAY OF TROPONIN QUANT: CPT

## 2020-09-01 PROCEDURE — 74011250636 HC RX REV CODE- 250/636: Performed by: RADIOLOGY

## 2020-09-01 PROCEDURE — 36415 COLL VENOUS BLD VENIPUNCTURE: CPT

## 2020-09-01 PROCEDURE — 81001 URINALYSIS AUTO W/SCOPE: CPT

## 2020-09-01 PROCEDURE — 70496 CT ANGIOGRAPHY HEAD: CPT

## 2020-09-01 PROCEDURE — 65660000000 HC RM CCU STEPDOWN

## 2020-09-01 PROCEDURE — 70553 MRI BRAIN STEM W/O & W/DYE: CPT

## 2020-09-01 PROCEDURE — 70450 CT HEAD/BRAIN W/O DYE: CPT

## 2020-09-01 PROCEDURE — 93005 ELECTROCARDIOGRAM TRACING: CPT

## 2020-09-01 PROCEDURE — 85610 PROTHROMBIN TIME: CPT

## 2020-09-01 PROCEDURE — 83735 ASSAY OF MAGNESIUM: CPT

## 2020-09-01 PROCEDURE — 74011250636 HC RX REV CODE- 250/636: Performed by: STUDENT IN AN ORGANIZED HEALTH CARE EDUCATION/TRAINING PROGRAM

## 2020-09-01 PROCEDURE — 96374 THER/PROPH/DIAG INJ IV PUSH: CPT

## 2020-09-01 RX ORDER — GUAIFENESIN 100 MG/5ML
81 LIQUID (ML) ORAL DAILY
Status: DISCONTINUED | OUTPATIENT
Start: 2020-09-02 | End: 2020-09-02 | Stop reason: HOSPADM

## 2020-09-01 RX ORDER — TIMOLOL MALEATE 5 MG/ML
1 SOLUTION/ DROPS OPHTHALMIC DAILY
Status: DISCONTINUED | OUTPATIENT
Start: 2020-09-02 | End: 2020-09-02 | Stop reason: HOSPADM

## 2020-09-01 RX ORDER — MELATONIN
2 DAILY
Status: DISCONTINUED | OUTPATIENT
Start: 2020-09-02 | End: 2020-09-02 | Stop reason: HOSPADM

## 2020-09-01 RX ORDER — ASPIRIN 325 MG
325 TABLET ORAL
Status: COMPLETED | OUTPATIENT
Start: 2020-09-01 | End: 2020-09-01

## 2020-09-01 RX ORDER — DIFLUPREDNATE 0.5 MG/ML
1 EMULSION OPHTHALMIC 2 TIMES DAILY
Status: DISCONTINUED | OUTPATIENT
Start: 2020-09-02 | End: 2020-09-02 | Stop reason: HOSPADM

## 2020-09-01 RX ORDER — SODIUM CHLORIDE 0.9 % (FLUSH) 0.9 %
5-40 SYRINGE (ML) INJECTION AS NEEDED
Status: DISCONTINUED | OUTPATIENT
Start: 2020-09-01 | End: 2020-09-02 | Stop reason: HOSPADM

## 2020-09-01 RX ORDER — LABETALOL HCL 20 MG/4 ML
10 SYRINGE (ML) INTRAVENOUS ONCE
Status: COMPLETED | OUTPATIENT
Start: 2020-09-01 | End: 2020-09-01

## 2020-09-01 RX ORDER — ENOXAPARIN SODIUM 100 MG/ML
40 INJECTION SUBCUTANEOUS DAILY
Status: DISCONTINUED | OUTPATIENT
Start: 2020-09-02 | End: 2020-09-02 | Stop reason: HOSPADM

## 2020-09-01 RX ORDER — DUREZOL 0.5 MG/ML
1 EMULSION OPHTHALMIC 2 TIMES DAILY
COMMUNITY
End: 2021-02-23

## 2020-09-01 RX ORDER — GADOTERATE MEGLUMINE 376.9 MG/ML
10 INJECTION INTRAVENOUS
Status: COMPLETED | OUTPATIENT
Start: 2020-09-01 | End: 2020-09-01

## 2020-09-01 RX ORDER — SODIUM CHLORIDE 0.9 % (FLUSH) 0.9 %
5-40 SYRINGE (ML) INJECTION EVERY 8 HOURS
Status: DISCONTINUED | OUTPATIENT
Start: 2020-09-01 | End: 2020-09-02 | Stop reason: HOSPADM

## 2020-09-01 RX ADMIN — IOPAMIDOL 100 ML: 755 INJECTION, SOLUTION INTRAVENOUS at 16:25

## 2020-09-01 RX ADMIN — GADOTERATE MEGLUMINE 10 ML: 376.9 INJECTION INTRAVENOUS at 20:14

## 2020-09-01 RX ADMIN — ASPIRIN 325 MG: 325 TABLET ORAL at 14:52

## 2020-09-01 RX ADMIN — LABETALOL HYDROCHLORIDE 10 MG: 5 INJECTION, SOLUTION INTRAVENOUS at 14:52

## 2020-09-01 NOTE — ED TRIAGE NOTES
Pt reports dizziness onset about 1-2 weeks ago that is exacerbated by changes in position. Pt also reports her blood pressure has been elevated. Recent eye surgery for glaucoma in July and vision has been improving since. No acute vision changes, unilateral weakness, or speech difficulty.

## 2020-09-01 NOTE — PROGRESS NOTES
9/1/2020  6:54 PM  Case management note    Reason for Admission:   Suspect CVA    Face to face/ both masked    Patient lives alone. She is independent and drives. Her son lives close and his . She has history of DDD, osteopenia and HLD. Patient uses a cane for ambulation  She came to ED for increase in BP and dizziness  [facility] [place]                   RUR Score:        9%             Plan for utilizing home health: To be evaluated by PT/OT    PCP: First and Last name:  Deanne Lutz   Name of Practice:    Are you a current patient: Yes/No: yes   Approximate date of last visit: 1 month   Can you participate in a virtual visit with your PCP: yes                    Current Advanced Directive/Advance Care Plan: no AD, patient is not interested in doing one. Son is  and will handle when time comes. Transition of Care Plan:               1. Home with family assistance  2. PCP follow up  3. AD planning  4. Neuro follow up  5. CM to follow for discharge needs.     Care Management Interventions  PCP Verified by CM: Yes(dr. ariel rodriguez)  Mode of Transport at Discharge: Self  Current Support Network: Lives Alone  Confirm Follow Up Transport: Family  The Plan for Transition of Care is Related to the Following Treatment Goals : suspect CVA  Discharge Location  Discharge Placement: Home with family assistance  Alma Woodruff

## 2020-09-01 NOTE — PROGRESS NOTES
[facility]: MED RECONCILIATION    Comments/Recommendations:   Med rec completed with patient via phone  Medication allergies and preferred pharmacy verified    Medications added:     Durezol 0.05% eye drop     Medications removed:    None    Medications adjusted:    None    Information obtained from: Patient    Allergies: Patient has no known allergies. Prior to Admission Medications:     Medication Documentation Review Audit       Reviewed by Sara Knight (Pharmacy Student) on 09/01/20 at 1550      Medication Sig Documenting Provider Last Dose Status Taking? cholecalciferol, vitamin D3, (Vitamin D3) 50 mcg (2,000 unit) tab Take 1 Tab by mouth daily. Provider, Historical 8/31/2020 Unknown time Active Yes   difluprednate (DurezoL) 0.05 % ophthalmic emulsion Administer 1 Drop to left eye two (2) times a day. Provider, Historical 9/1/2020 Unknown time Active Yes   timolol (TIMOPTIC) 0.5 % ophthalmic solution Administer 1 Drop to right eye.  Provider, Historical 8/31/2020 Unknown time Active Yes                    Thank you,    Gurmeet Glass, PharmD Candidate [id number]

## 2020-09-01 NOTE — H&P
[address] [facility] [address] [place] [address]   Office [phone]  Fax [phone]       Admission H&P     Name: Nina Mohamud MRN: [id number]  Sex: Female   YOB: 1930  Age: 80 y.o. PCP: Aunrag Moon MD     Source of Information: patient, medical records    Chief complaint: intermittent positional dizziness    History of Present Illness  Nina Mohamud is a 80 y.o. female with known subarachnoid hemorrhage (1983), DDD, Vitamin D deficiency, osteopenia, hypercholesteremia, open angle glaucoma who presents to the ER complaining of intermittent positional dizziness and elevated blood pressure this morning. She measures her BP routinely and recalls a diastolic pressure of 462 this morning prompting her to come to the ED, no history of HTN other than white coat syndrome. The dizziness is worse when going from sitting/lying to standings and resolves in about 30 seconds. Endorses a sense of imbalance but denies sensation that the room is spinning. Denies chest pain, SOB, palpitations, cough, diarrhea, nausea, vomiting, head ache, vision changes, syncopal episodes. She denies decreased appetite and reports she drinks 4-8 glasses of water and milk a day. She says her vision in the left eye has been gradually improving since surgery but is not perfect yet. Typically ambulates independently but has been using cane since surgery due to dizziness. CT non-contrast of head shows no acute intracranial findings. COVID Screening Questions:   Experiencing any of the following symptoms: fever, chills, cough, SOB, diarrhea, URI symptoms. No  Any Sick contacts with fever, cough, diarrhea, SOB, URI symptoms. No  Traveled out of state or out of country. No  Works in health care or high risk environment. No    In the Er:   vital signs were remarkable for 213/120 on admission. Labs were remarkable for Na 135, BUN 22.   EKG showed normal sinus rhythm, no signs of ischemia, normal axis, unchanged from 2013  CT non-contrast of head shows no acute intracranial findings. UA shows trace leukocyte esterase and ketones, no signs of infection      Pt was treated with labetalol 10mg IV and aspirin 325mg po. Patient Vitals for the past 12 hrs:   Temp Pulse Resp BP SpO2   09/01/20 1515  76 12 148/76 95 %   09/01/20 1510  75 17 145/78 94 %   09/01/20 1445  92 26 (!) 173/96 97 %   09/01/20 1315  89 11 (!) 183/102 97 %   09/01/20 1310     96 %   09/01/20 1300  85 14 (!) 188/107 96 %   09/01/20 1253  80 11  100 %   09/01/20 1252    (!) 189/107    09/01/20 1145 97.6 °F (36.4 °C) 91 18 (!) 213/120 97 %          Past Medical History:   Diagnosis Date    DDD (degenerative disc disease)     Duodenal ulcer 1976    FH: leukemia     Headache(784.0)     Hypercholesterolemia     Osteopenia     Osteoporosis     Rectal polyp     Subarachnoid hemorrhage ([address] 1983        Home Medications   Prior to Admission medications    Medication Sig Start Date End Date Taking? Authorizing Provider   cholecalciferol, vitamin D3, (Vitamin D3) 50 mcg (2,000 unit) tab Take  by mouth. Provider, Historical   timolol (TIMOPTIC) 0.5 % ophthalmic solution Administer 1 Drop to both eyes.  6/8/10   Provider, Historical       Allergies  No Known Allergies    Past Medical History:   Diagnosis Date    DDD (degenerative disc disease)     Duodenal ulcer 1976    FH: leukemia     Headache(784.0)     Hypercholesterolemia     Osteopenia     Osteoporosis     Rectal polyp     Subarachnoid hemorrhage ([address] 1983       Past Surgical History:   Procedure Laterality Date    HX CYSTOCELE REPAIR      HX HYSTERECTOMY      2/2 fibroids    HX MOHS PROCEDURES      HX ORTHOPAEDIC      left foot surgery    HX UROLOGICAL      bladder surgery    REPAIR OF RECTOCELE         Family History   Problem Relation Age of Onset    Cancer Sister         leukemia    Cancer Brother         leukemia       Social History  Social History     Socioeconomic History    Marital status:      Spouse name: Not on file    Number of children: Not on file    Years of education: Not on file    Highest education level: Not on file   Occupational History    Not on file   Social Needs    Financial resource strain: Not on file    Food insecurity     Worry: Not on file     Inability: Not on file    Transportation needs     Medical: Not on file     Non-medical: Not on file   Tobacco Use    Smoking status: Former Smoker     Packs/day: 0.50     Years: 20.00     Pack years: 10.00     Last attempt to quit: 7/15/1976     Years since quittin.1   Substance and Sexual Activity    Alcohol use: No    Drug use: Not on file    Sexual activity: Not on file   Lifestyle    Physical activity     Days per week: Not on file     Minutes per session: Not on file    Stress: Not on file   Relationships    Social connections     Talks on phone: Not on file     Gets together: Not on file     Attends [employer] service: Not on file     Active member of club or organization: Not on file     Attends meetings of clubs or organizations: Not on file     Relationship status: Not on file    Intimate partner violence     Fear of current or ex partner: Not on file     Emotionally abused: Not on file     Physically abused: Not on file     Forced sexual activity: Not on file   Other Topics Concern    Not on file   Social History Narrative    Not on file       Alcohol history: Not at all  Smoking history: Non-smoker  Illicit drug history: Not at all    Living arrangement: patient lives alone. Ambulates: Independently     Review of Systems:   Review of Systems   Constitutional: Negative for chills, fatigue and fever. Eyes: Negative for photophobia and visual disturbance. Respiratory: Negative for cough and shortness of breath. Cardiovascular: Negative for chest pain, palpitations and leg swelling.    Gastrointestinal: Negative for abdominal pain, blood in stool, constipation, diarrhea and nausea. Genitourinary: Negative for dysuria. Neurological: Positive for dizziness. Negative for weakness, light-headedness, numbness and headaches. Psychiatric/Behavioral: The patient is nervous/anxious (in regards to symptoms). Physical Exam      O2 Device: Room air   Physical Exam  Vitals signs and nursing note reviewed. Exam conducted with a chaperone present. Constitutional:       Appearance: Normal appearance. She is normal weight. HENT:      Head: Normocephalic and atraumatic. Nose: Nose normal.      Mouth/Throat:      Mouth: Mucous membranes are moist.   Eyes:      Extraocular Movements: Extraocular movements intact. Conjunctiva/sclera: Conjunctivae normal.      Pupils: Pupils are equal, round, and reactive to light. Neck:      Musculoskeletal: Normal range of motion and neck supple. Cardiovascular:      Rate and Rhythm: Normal rate and regular rhythm. Pulses: Normal pulses. Heart sounds: Normal heart sounds. Pulmonary:      Effort: Pulmonary effort is normal.      Breath sounds: Normal breath sounds. Abdominal:      General: Abdomen is flat. Palpations: Abdomen is soft. Tenderness: There is no abdominal tenderness. There is no guarding. Musculoskeletal: Normal range of motion. Skin:     General: Skin is warm and dry. Capillary Refill: Capillary refill takes less than 2 seconds. Neurological:      General: No focal deficit present. Mental Status: She is alert and oriented to person, place, and time. Cranial Nerves: No cranial nerve deficit. Sensory: Sensation is intact. No sensory deficit. Motor: No weakness, tremor or pronator drift.       Coordination: Heel to Shin Test normal.   Psychiatric:         Mood and Affect: Mood normal.         Behavior: Behavior normal.          Laboratory Data  Recent Results (from the past 8 hour(s))   SAMPLES BEING HELD    Collection Time: 09/01/20 12:15 PM   Result Value Ref Range SAMPLES BEING HELD 1SST,1BL     COMMENT        Add-on orders for these samples will be processed based on acceptable specimen integrity and analyte stability, which may vary by analyte. CBC WITH AUTOMATED DIFF    Collection Time: 09/01/20 12:15 PM   Result Value Ref Range    WBC 9.7 3.6 - 11.0 K/uL    RBC 5.00 3.80 - 5.20 M/uL    HGB 15.0 11.5 - 16.0 g/dL    HCT 47.2 (H) 35.0 - 47.0 %    MCV 94.4 80.0 - 99.0 FL    MCH 30.0 26.0 - 34.0 PG    MCHC 31.8 30.0 - 36.5 g/dL    RDW 13.4 11.5 - 14.5 %    PLATELET 732 397 - 130 K/uL    MPV 10.5 8.9 - 12.9 FL    NRBC 0.0 0  WBC    ABSOLUTE NRBC 0.00 0.00 - 0.01 K/uL    NEUTROPHILS 69 32 - 75 %    LYMPHOCYTES 18 12 - 49 %    MONOCYTES 11 5 - 13 %    EOSINOPHILS 2 0 - 7 %    BASOPHILS 0 0 - 1 %    IMMATURE GRANULOCYTES 0 0.0 - 0.5 %    ABS. NEUTROPHILS 6.7 1.8 - 8.0 K/UL    ABS. LYMPHOCYTES 1.7 0.8 - 3.5 K/UL    ABS. MONOCYTES 1.0 0.0 - 1.0 K/UL    ABS. EOSINOPHILS 0.2 0.0 - 0.4 K/UL    ABS. BASOPHILS 0.0 0.0 - 0.1 K/UL    ABS. IMM. GRANS. 0.0 0.00 - 0.04 K/UL    DF AUTOMATED     METABOLIC PANEL, COMPREHENSIVE    Collection Time: 09/01/20 12:15 PM   Result Value Ref Range    Sodium 135 (L) 136 - 145 mmol/L    Potassium 3.6 3.5 - 5.1 mmol/L    Chloride 98 97 - 108 mmol/L    CO2 29 21 - 32 mmol/L    Anion gap 8 5 - 15 mmol/L    Glucose 92 65 - 100 mg/dL    BUN 22 (H) 6 - 20 MG/DL    Creatinine 0.80 0.55 - 1.02 MG/DL    BUN/Creatinine ratio 28 (H) 12 - 20      GFR est AA >60 >60 ml/min/1.73m2    GFR est non-AA >60 >60 ml/min/1.73m2    Calcium 9.1 8.5 - 10.1 MG/DL    Bilirubin, total 0.6 0.2 - 1.0 MG/DL    ALT (SGPT) 18 12 - 78 U/L    AST (SGOT) 20 15 - 37 U/L    Alk.  phosphatase 106 45 - 117 U/L    Protein, total 8.1 6.4 - 8.2 g/dL    Albumin 3.6 3.5 - 5.0 g/dL    Globulin 4.5 (H) 2.0 - 4.0 g/dL    A-G Ratio 0.8 (L) 1.1 - 2.2     TROPONIN I    Collection Time: 09/01/20 12:15 PM   Result Value Ref Range    Troponin-I, Qt. <0.05 <0.05 ng/mL   URINALYSIS W/MICROSCOPIC Collection Time: 09/01/20 12:56 PM   Result Value Ref Range    Color YELLOW/STRAW      Appearance CLEAR CLEAR      Specific gravity 1.006 1.003 - 1.030      pH (UA) 7.5 5.0 - 8.0      Protein 30 (A) NEG mg/dL    Glucose Negative NEG mg/dL    Ketone Negative NEG mg/dL    Bilirubin Negative NEG      Blood MODERATE (A) NEG      Urobilinogen 0.2 0.2 - 1.0 EU/dL    Nitrites Negative NEG      Leukocyte Esterase TRACE (A) NEG      WBC 0-4 0 - 4 /hpf    RBC 10-20 0 - 5 /hpf    Epithelial cells FEW FEW /lpf    Bacteria Negative NEG /hpf    Hyaline cast 0-2 0 - 5 /lpf   URINE CULTURE HOLD SAMPLE    Collection Time: 09/01/20 12:56 PM    Specimen: Serum; Urine   Result Value Ref Range    Urine culture hold        Urine on hold in Microbiology dept for 2 days. If unpreserved urine is submitted, it cannot be used for addtional testing after 24 hours, recollection will be required. EKG, 12 LEAD, INITIAL    Collection Time: 09/01/20  1:00 PM   Result Value Ref Range    Ventricular Rate 90 BPM    Atrial Rate 90 BPM    P-R Interval 168 ms    QRS Duration 84 ms    Q-T Interval 358 ms    QTC Calculation (Bezet) 437 ms    Calculated P Axis 64 degrees    Calculated R Axis 32 degrees    Calculated T Axis 68 degrees    Diagnosis       Normal sinus rhythm  Minimal voltage criteria for LVH, may be normal variant  Borderline ECG  When compared with ECG of 06-NOV-2013 22:23,  No significant change was found         Imaging  CXR Results  (Last 48 hours)    None        CT Results  (Last 48 hours)               09/01/20 1214  CT HEAD WO CONT Final result    Impression:  IMPRESSION:    No acute intracranial process identified       Narrative:  EXAM: CT HEAD WO CONT       INDICATION: dizziness, elevated blood pressure       COMPARISON: None. CONTRAST: None. TECHNIQUE: Unenhanced CT of the head was performed using 5 mm images. Brain and   bone windows were generated. Coronal and sagittal reformats.  CT dose reduction   was achieved through use of a standardized protocol tailored for this   examination and automatic exposure control for dose modulation. FINDINGS:   The ventricles and sulci are normal in size, shape and configuration. There is   mild periventricular white matter hypodensity. There is no intracranial   hemorrhage, extra-axial collection, or mass effect. The basilar cisterns are   open. No CT evidence of acute infarct. The bone windows demonstrate no abnormalities. The visualized portions of the   paranasal sinuses and mastoid air cells are clear. Assessment and Plan     Cecilio Dao is a 80 y.o. female who is admitted for rule out of CVA/stoke. CVA/Stroke Rule out: Intermittent positional dizziness following L eye glaucoma surgery 7/15. 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 UA shows trace leukocytes and ketones. Neuro consulted and recommended a CTA head to evaluate posterior circulation.  - NPO until dysphagia screen  - Vital signs per unit protocol  - Neuro check q4h   - ASA 81mg Daily  - Permissive HTN for the first 24-48 hours SBP<220 and DBP<110    - MRI pending   - pending per neuro consult   - Echocardiogram pending  - TSH  - PT/INR pending  - Lipid panel not ordered due to age and only slight elevation in 2017  - HgbA1C not ordered at this time due to normal glucose measurements past 10 years   - Ammonia, UDS pending  - Toponin < 0.05 POA. Trend troponins q6H   - CBC,CMP, Mg, Phos daily  - NPO until bedside swallow passed. Patient seen eating in ED without issues. Swallow study ordered.  - PT/OT/CM consulted  - orthostatics pending  - Neuro consulted, appreciate rec    Elevated BP: No history of hypertension. Patient reports white coat syndrome related to BP. Measures BP at home and reports significant elevation day of admission with . - BP on admission 213/120.  At this time 148/76.  - No home medications   - Permissive HTN for the first 24-48 hours SBP<220 and DBP<110    - Will continue to monitor at this time and readjust as BP's trend. Hyperlipidemia: Last lipid panel on 4/18/2017 and values were total cholesterol 204, , HDL 63, VLDL 21, . No home medications.  -  Statin contraindicated if dyslipidemia present due to age, will not order labs at this point    Open-angle glaucoma: Bilateral.  S/p left eye surgery on 7/15 by Dr. Vickii Kehr. Reports gradual improvement of vision since surgery but maintains some deficits. - Continue timolol drops  - Continue difluprednate in left eye    Vitamin D Deficiency  - Continue home Vitamin D 50 mcg po daily    Osteopenia: Likely secondary to vit D def. No fractures reported. - Continue home Vitamin D 50 mcg po daily    Degenerative Disc Disease: per problem list.  No complaints at this time. FEN/GI - NPO until dysphagia screen  Activity - Out of bed with assistance  DVT prophylaxis - Lovenox  GI prophylaxis - Not indicated at this time  Fall prophylaxis - Fall precautions ordered. Disposition - Admit to Telemetry. Plan to d/c to Home. Consulting PT/OT/CM  Code Status - Full, discussed with patient / caregivers. Next of Kin Name and Dar (son), [phone]    Patient Omar Has will be discussed Dr. Enrico Benoit.      3:45 PM, 09/01/20  Richy Sam DO  Family Medicine Resident       For Billing    Chief Complaint   Patient presents with   [facility] Problems  Date Reviewed: 7/13/2020          Codes Class Noted POA    Hypertensive urgency ICD-10-CM: I16.0  ICD-9-CM: 401.9  9/1/2020 Unknown        Suspected cerebrovascular accident ICD-10-CM: R09.89  ICD-9-CM: 785.9  9/1/2020 Unknown

## 2020-09-01 NOTE — ED PROVIDER NOTES
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 She reports as a sensation of feeling off balance, denies any associated headache, neck pain, vomiting, peripheral vision loss, or lateralizing weakness or sensory changes. No associated speech deficit. Symptoms are worse with positional change. She denies any recent changes to her medication regimen aside from eyedrops she uses for glaucoma. No chest pain, shortness of breath, cough or recent illness. Denies any history of prior stroke. She does not take any medications for hypertension but she was noted to be hypertensive to the 355B systolic on arrival here. No other systemic complaints.   Symptoms are moderate in nature without any alleviating factors      Past Medical History:   Diagnosis Date    DDD (degenerative disc disease)     Duodenal ulcer 1976    FH: leukemia     Headache(784.0)     Hypercholesterolemia     Osteopenia     Osteoporosis     Rectal polyp     Subarachnoid hemorrhage ([facility] [address] 1983       Past Surgical History:   Procedure Laterality Date    HX CYSTOCELE REPAIR      HX HYSTERECTOMY      2/2 fibroids    HX MOHS PROCEDURES      HX ORTHOPAEDIC      left foot surgery    HX UROLOGICAL      bladder surgery    REPAIR OF RECTOCELE           Family History:   Problem Relation Age of Onset    Cancer Sister         leukemia    Cancer Brother         leukemia       Social History     Socioeconomic History    Marital status:      Spouse name: Not on file    Number of children: Not on file    Years of education: Not on file    Highest education level: Not on file   Occupational History    Not on file   Social Needs    Financial resource strain: Not on file    Food insecurity     Worry: Not on file     Inability: Not on file    Transportation needs     Medical: Not on file     Non-medical: Not on file   Tobacco Use    Smoking status: Former Smoker     Packs/day: 0.50     Years: 20.00     Pack years: 10.00     Last attempt to quit: 7/15/1976     Years since quittin.1   Substance and Sexual Activity    Alcohol use: No    Drug use: Not on file    Sexual activity: Not on file   Lifestyle    Physical activity     Days per week: Not on file     Minutes per session: Not on file    Stress: Not on file   Relationships    Social connections     Talks on phone: Not on file     Gets together: Not on file     Attends Jain service: Not on file     Active member of club or organization: Not on file     Attends meetings of clubs or organizations: Not on file     Relationship status: Not on file    Intimate partner violence     Fear of current or ex partner: Not on file     Emotionally abused: Not on file     Physically abused: Not on file     Forced sexual activity: Not on file   Other Topics Concern    Not on file   Social History Narrative    Not on file         ALLERGIES: Patient has no known allergies. Review of Systems   Constitutional: Negative for fever. HENT: Negative for facial swelling. Eyes: Negative for visual disturbance. Respiratory: Negative for shortness of breath. Cardiovascular: Negative for chest pain. Gastrointestinal: Negative for abdominal pain and vomiting. Genitourinary: Negative for difficulty urinating. Musculoskeletal: Negative for neck pain. Neurological: Positive for dizziness. Psychiatric/Behavioral: Negative for confusion.        Vitals:    20 1253 20 1300 20 1310 20 1315   BP:  (!) 188/107  (!) 183/102   Pulse: 80 85  89   Resp: 11 14  11   Temp:       SpO2: 100% 96% 96% 97%   Weight:       Height:                Physical Exam  General:  Awake and alert, NAD  HEENT:  NC/AT, anisocoria (left pupil > right pupil) although both reactive to light, no nystagmus, moist mucous membranes  Neck:   Normal inspection, full range of motion  Cardiac:  RRR, no murmurs  Respiratory:  Clear bilaterally, no wheezes, rales, rhonchi  Abdomen:  Soft and nontender, nondistended  Extremities: Warm and well perfused, no peripheral edema  Neuro:  CN grossly intact, moving all extremities symmetrically without gross motor deficit  Skin:   No rashes or pallor    RESULTS  Recent Results (from the past 12 hour(s))   SAMPLES BEING HELD    Collection Time: 09/01/20 12:15 PM   Result Value Ref Range    SAMPLES BEING HELD 1SST,1BL     COMMENT        Add-on orders for these samples will be processed based on acceptable specimen integrity and analyte stability, which may vary by analyte. CBC WITH AUTOMATED DIFF    Collection Time: 09/01/20 12:15 PM   Result Value Ref Range    WBC 9.7 3.6 - 11.0 K/uL    RBC 5.00 3.80 - 5.20 M/uL    HGB 15.0 11.5 - 16.0 g/dL    HCT 47.2 (H) 35.0 - 47.0 %    MCV 94.4 80.0 - 99.0 FL    MCH 30.0 26.0 - 34.0 PG    MCHC 31.8 30.0 - 36.5 g/dL    RDW 13.4 11.5 - 14.5 %    PLATELET 639 646 - 850 K/uL    MPV 10.5 8.9 - 12.9 FL    NRBC 0.0 0  WBC    ABSOLUTE NRBC 0.00 0.00 - 0.01 K/uL    NEUTROPHILS 69 32 - 75 %    LYMPHOCYTES 18 12 - 49 %    MONOCYTES 11 5 - 13 %    EOSINOPHILS 2 0 - 7 %    BASOPHILS 0 0 - 1 %    IMMATURE GRANULOCYTES 0 0.0 - 0.5 %    ABS. NEUTROPHILS 6.7 1.8 - 8.0 K/UL    ABS. LYMPHOCYTES 1.7 0.8 - 3.5 K/UL    ABS. MONOCYTES 1.0 0.0 - 1.0 K/UL    ABS. EOSINOPHILS 0.2 0.0 - 0.4 K/UL    ABS. BASOPHILS 0.0 0.0 - 0.1 K/UL    ABS. IMM.  GRANS. 0.0 0.00 - 0.04 K/UL    DF AUTOMATED     METABOLIC PANEL, COMPREHENSIVE    Collection Time: 09/01/20 12:15 PM   Result Value Ref Range    Sodium 135 (L) 136 - 145 mmol/L    Potassium 3.6 3.5 - 5.1 mmol/L    Chloride 98 97 - 108 mmol/L    CO2 29 21 - 32 mmol/L    Anion gap 8 5 - 15 mmol/L    Glucose 92 65 - 100 mg/dL    BUN 22 (H) 6 - 20 MG/DL    Creatinine 0.80 0.55 - 1.02 MG/DL    BUN/Creatinine ratio 28 (H) 12 - 20      GFR est AA >60 >60 ml/min/1.73m2    GFR est non-AA >60 >60 ml/min/1.73m2    Calcium 9.1 8.5 - 10.1 MG/DL    Bilirubin, total 0.6 0.2 - 1.0 MG/DL    ALT (SGPT) 18 12 - 78 U/L    AST (SGOT) 20 15 - 37 U/L    Alk. phosphatase 106 45 - 117 U/L    Protein, total 8.1 6.4 - 8.2 g/dL    Albumin 3.6 3.5 - 5.0 g/dL    Globulin 4.5 (H) 2.0 - 4.0 g/dL    A-G Ratio 0.8 (L) 1.1 - 2.2     TROPONIN I    Collection Time: 09/01/20 12:15 PM   Result Value Ref Range    Troponin-I, Qt. <0.05 <0.05 ng/mL   URINALYSIS W/MICROSCOPIC    Collection Time: 09/01/20 12:56 PM   Result Value Ref Range    Color YELLOW/STRAW      Appearance CLEAR CLEAR      Specific gravity 1.006 1.003 - 1.030      pH (UA) 7.5 5.0 - 8.0      Protein 30 (A) NEG mg/dL    Glucose Negative NEG mg/dL    Ketone Negative NEG mg/dL    Bilirubin Negative NEG      Blood MODERATE (A) NEG      Urobilinogen 0.2 0.2 - 1.0 EU/dL    Nitrites Negative NEG      Leukocyte Esterase TRACE (A) NEG      WBC 0-4 0 - 4 /hpf    RBC 10-20 0 - 5 /hpf    Epithelial cells FEW FEW /lpf    Bacteria Negative NEG /hpf    Hyaline cast 0-2 0 - 5 /lpf   URINE CULTURE HOLD SAMPLE    Collection Time: 09/01/20 12:56 PM    Specimen: Serum; Urine   Result Value Ref Range    Urine culture hold        Urine on hold in Microbiology dept for 2 days. If unpreserved urine is submitted, it cannot be used for addtional testing after 24 hours, recollection will be required.    EKG, 12 LEAD, INITIAL    Collection Time: 09/01/20  1:00 PM   Result Value Ref Range    Ventricular Rate 90 BPM    Atrial Rate 90 BPM    P-R Interval 168 ms    QRS Duration 84 ms    Q-T Interval 358 ms    QTC Calculation (Bezet) 437 ms    Calculated P Axis 64 degrees    Calculated R Axis 32 degrees    Calculated T Axis 68 degrees    Diagnosis       Normal sinus rhythm  Minimal voltage criteria for LVH, may be normal variant  Borderline ECG  When compared with ECG of 06-NOV-2013 22:23,  No significant change was found          IMAGING  Ct Head Wo Cont    Result Date: 9/1/2020  IMPRESSION: No acute intracranial process identified      Procedures - none unless documented below  EKG as interpreted by me: Normal sinus rhythm at a rate of 90, normal axis and intervals, grossly no ST or T wave changes suggesting acute ischemia. ED course: Labs, EKG and imaging reviewed. Presented with dizziness intermittent for the last 1-2 months with worsening this morning while standing, nonfocal neurologic exam.  CT head, labs and EKG unremarkable. No hx hypertension but with systolics in the 357'J on arrival, IV labetalol ordered. Needs admission for MRI to evaluate for posterior circulation CVA, also BP management. Will admit for continued management, discussed with the FM resident. Anthonyist Miguel Serve for Admission  1:39 PM    ED Room Number:   ER14/14  Patient Name and age:  Tasha Cole 80 y.o.  female  Working Diagnosis:     1. Dizziness    2. Hypertensive urgency      COVID suspicion:   no  Code Status:    Full Code  Readmission:    no  Isolation Requirements:  no  Recommended Level of Care: telemetry  Department:    [address] [place] [address] ED - [phone]  Other:     Presented with dizziness intermittent for the last 1-2 months with worsening this morning while standing, nonfocal neurologic exam.  CT head, labs and EKG unremarkable. No hx hypertension but with systolics in the 807'J on arrival, IV labetalol ordered. Needs admission for MRI to evaluate for posterior circulation CVA, also BP management.

## 2020-09-02 ENCOUNTER — APPOINTMENT (OUTPATIENT)
Dept: NON INVASIVE DIAGNOSTICS | Age: 85
End: 2020-09-02
Attending: STUDENT IN AN ORGANIZED HEALTH CARE EDUCATION/TRAINING PROGRAM
Payer: MEDICARE

## 2020-09-02 ENCOUNTER — TELEPHONE (OUTPATIENT)
Dept: FAMILY MEDICINE CLINIC | Age: 85
End: 2020-09-02

## 2020-09-02 VITALS
WEIGHT: 124.12 LBS | DIASTOLIC BLOOD PRESSURE: 65 MMHG | SYSTOLIC BLOOD PRESSURE: 102 MMHG | TEMPERATURE: 97.6 F | HEART RATE: 84 BPM | OXYGEN SATURATION: 95 % | HEIGHT: 65 IN | BODY MASS INDEX: 20.68 KG/M2 | RESPIRATION RATE: 14 BRPM

## 2020-09-02 PROBLEM — E04.1 THYROID NODULE: Status: ACTIVE | Noted: 2020-09-02

## 2020-09-02 PROBLEM — H40.1132 PRIMARY OPEN ANGLE GLAUCOMA OF BOTH EYES, MODERATE STAGE: Status: ACTIVE | Noted: 2017-09-11

## 2020-09-02 PROBLEM — Z96.1 BILATERAL PSEUDOPHAKIA: Status: ACTIVE | Noted: 2017-09-11

## 2020-09-02 PROBLEM — R09.89 SUSPECTED CEREBROVASCULAR ACCIDENT (CVA): Status: ACTIVE | Noted: 2020-09-02

## 2020-09-02 LAB
AMPHET UR QL SCN: NEGATIVE
ANION GAP SERPL CALC-SCNC: 7 MMOL/L (ref 5–15)
BARBITURATES UR QL SCN: NEGATIVE
BENZODIAZ UR QL: NEGATIVE
BUN SERPL-MCNC: 23 MG/DL (ref 6–20)
BUN/CREAT SERPL: 30 (ref 12–20)
CALCIUM SERPL-MCNC: 8.8 MG/DL (ref 8.5–10.1)
CANNABINOIDS UR QL SCN: NEGATIVE
CHLORIDE SERPL-SCNC: 100 MMOL/L (ref 97–108)
CO2 SERPL-SCNC: 29 MMOL/L (ref 21–32)
COCAINE UR QL SCN: NEGATIVE
CREAT SERPL-MCNC: 0.76 MG/DL (ref 0.55–1.02)
DRUG SCRN COMMENT,DRGCM: NORMAL
ECHO AO ASC DIAM: 2.92 CM
ECHO AO ROOT DIAM: 2.98 CM
ECHO AR MAX VEL PISA: 237.21 CENTIMETER/SECOND
ECHO AV AREA PEAK VELOCITY: 1.65 CM2
ECHO AV AREA VTI: 2.02 CM2
ECHO AV AREA/BSA PEAK VELOCITY: 1 CM2/M2
ECHO AV AREA/BSA VTI: 1.3 CM2/M2
ECHO AV MEAN GRADIENT: 9.42 MMHG
ECHO AV PEAK GRADIENT: 16.98 MMHG
ECHO AV PEAK VELOCITY: 206.05 CM/S
ECHO AV REGURGITANT PHT: 0.64 S
ECHO AV VTI: 39.01 CM
ECHO IVC PROX: 1.34 CM
ECHO LA AREA 4C: 8.91 CM2
ECHO LA MAJOR AXIS: 2.07 CM
ECHO LA MINOR AXIS: 1.28 CM
ECHO LA VOL 2C: 19.73 ML (ref 22–52)
ECHO LA VOL 4C: 15.36 ML (ref 22–52)
ECHO LA VOL BP: 20.12 ML (ref 22–52)
ECHO LA VOL/BSA BIPLANE: 12.46 ML/M2 (ref 16–28)
ECHO LA VOLUME INDEX A2C: 12.22 ML/M2 (ref 16–28)
ECHO LA VOLUME INDEX A4C: 9.51 ML/M2 (ref 16–28)
ECHO LV E' LATERAL VELOCITY: 6.43 CENTIMETER/SECOND
ECHO LV E' SEPTAL VELOCITY: 6.26 CENTIMETER/SECOND
ECHO LV INTERNAL DIMENSION DIASTOLIC: 3.27 CM (ref 3.9–5.3)
ECHO LV INTERNAL DIMENSION SYSTOLIC: 2.27 CM
ECHO LV IVSD: 1.12 CM (ref 0.6–0.9)
ECHO LV MASS 2D: 97.5 G (ref 67–162)
ECHO LV MASS INDEX 2D: 60.4 G/M2 (ref 43–95)
ECHO LV POSTERIOR WALL DIASTOLIC: 0.94 CM (ref 0.6–0.9)
ECHO LVOT DIAM: 2.14 CM
ECHO LVOT PEAK GRADIENT: 3.6 MMHG
ECHO LVOT PEAK VELOCITY: 94.85 CM/S
ECHO LVOT SV: 78.7 ML
ECHO LVOT VTI: 21.89 CM
ECHO MV A VELOCITY: 71.79 CENTIMETER/SECOND
ECHO MV AREA PHT: 2.32 CM2
ECHO MV E DECELERATION TIME (DT): 0.33 S
ECHO MV E VELOCITY: 42.79 CENTIMETER/SECOND
ECHO MV PRESSURE HALF TIME (PHT): 0.09 S
ECHO RV INTERNAL DIMENSION: 3.12 CM
ECHO RV TAPSE: 1.92 CM (ref 1.5–2)
ECHO TV REGURGITANT MAX VELOCITY: 218.39 CM/S
ECHO TV REGURGITANT PEAK GRADIENT: 19.08 MMHG
ERYTHROCYTE [DISTWIDTH] IN BLOOD BY AUTOMATED COUNT: 13.3 % (ref 11.5–14.5)
GLUCOSE SERPL-MCNC: 94 MG/DL (ref 65–100)
HCT VFR BLD AUTO: 44 % (ref 35–47)
HGB BLD-MCNC: 14 G/DL (ref 11.5–16)
LA VOL DISK BP: 17.37 ML (ref 22–52)
LVOT MG: 1.75 MMHG
MCH RBC QN AUTO: 29.5 PG (ref 26–34)
MCHC RBC AUTO-ENTMCNC: 31.8 G/DL (ref 30–36.5)
MCV RBC AUTO: 92.8 FL (ref 80–99)
METHADONE UR QL: NEGATIVE
NRBC # BLD: 0 K/UL (ref 0–0.01)
NRBC BLD-RTO: 0 PER 100 WBC
OPIATES UR QL: NEGATIVE
PCP UR QL: NEGATIVE
PLATELET # BLD AUTO: 215 K/UL (ref 150–400)
PMV BLD AUTO: 10.4 FL (ref 8.9–12.9)
POTASSIUM SERPL-SCNC: 3.4 MMOL/L (ref 3.5–5.1)
RBC # BLD AUTO: 4.74 M/UL (ref 3.8–5.2)
SODIUM SERPL-SCNC: 136 MMOL/L (ref 136–145)
TROPONIN I SERPL-MCNC: <0.05 NG/ML
UR CULT HOLD, URHOLD: NORMAL
WBC # BLD AUTO: 10.8 K/UL (ref 3.6–11)

## 2020-09-02 PROCEDURE — 80048 BASIC METABOLIC PNL TOTAL CA: CPT

## 2020-09-02 PROCEDURE — 85027 COMPLETE CBC AUTOMATED: CPT

## 2020-09-02 PROCEDURE — 84484 ASSAY OF TROPONIN QUANT: CPT

## 2020-09-02 PROCEDURE — 80307 DRUG TEST PRSMV CHEM ANLYZR: CPT

## 2020-09-02 PROCEDURE — 97161 PT EVAL LOW COMPLEX 20 MIN: CPT

## 2020-09-02 PROCEDURE — 97530 THERAPEUTIC ACTIVITIES: CPT

## 2020-09-02 PROCEDURE — 65390000012 HC CONDITION CODE 44 OBSERVATION

## 2020-09-02 PROCEDURE — 74011000250 HC RX REV CODE- 250: Performed by: STUDENT IN AN ORGANIZED HEALTH CARE EDUCATION/TRAINING PROGRAM

## 2020-09-02 PROCEDURE — 97165 OT EVAL LOW COMPLEX 30 MIN: CPT

## 2020-09-02 PROCEDURE — 95816 EEG AWAKE AND DROWSY: CPT | Performed by: PSYCHIATRY & NEUROLOGY

## 2020-09-02 PROCEDURE — 97535 SELF CARE MNGMENT TRAINING: CPT

## 2020-09-02 PROCEDURE — 93306 TTE W/DOPPLER COMPLETE: CPT

## 2020-09-02 PROCEDURE — 99218 HC RM OBSERVATION: CPT

## 2020-09-02 PROCEDURE — 97116 GAIT TRAINING THERAPY: CPT

## 2020-09-02 PROCEDURE — 99205 OFFICE O/P NEW HI 60 MIN: CPT | Performed by: PSYCHIATRY & NEUROLOGY

## 2020-09-02 PROCEDURE — 74011250637 HC RX REV CODE- 250/637: Performed by: STUDENT IN AN ORGANIZED HEALTH CARE EDUCATION/TRAINING PROGRAM

## 2020-09-02 PROCEDURE — 96372 THER/PROPH/DIAG INJ SC/IM: CPT

## 2020-09-02 PROCEDURE — 36415 COLL VENOUS BLD VENIPUNCTURE: CPT

## 2020-09-02 PROCEDURE — 99235 HOSP IP/OBS SAME DATE MOD 70: CPT | Performed by: FAMILY MEDICINE

## 2020-09-02 PROCEDURE — 74011250636 HC RX REV CODE- 250/636: Performed by: STUDENT IN AN ORGANIZED HEALTH CARE EDUCATION/TRAINING PROGRAM

## 2020-09-02 RX ADMIN — ASPIRIN 81 MG 81 MG: 81 TABLET ORAL at 08:01

## 2020-09-02 RX ADMIN — Medication 10 ML: at 08:01

## 2020-09-02 RX ADMIN — DIFLUPREDNATE 1 DROP: 0.5 EMULSION OPHTHALMIC at 09:00

## 2020-09-02 RX ADMIN — ENOXAPARIN SODIUM 40 MG: 40 INJECTION SUBCUTANEOUS at 08:00

## 2020-09-02 RX ADMIN — Medication 2 TABLET: at 12:57

## 2020-09-02 RX ADMIN — TIMOLOL MALEATE 1 DROP: 5 SOLUTION/ DROPS OPHTHALMIC at 12:57

## 2020-09-02 NOTE — PROGRESS NOTES
OCCUPATIONAL THERAPY EVALUATION/DISCHARGE  Patient: Yessica Montemayor (15 y.o. female)  Date: 9/2/2020  Primary Diagnosis: Suspected cerebrovascular accident [R09.89]  Hypertensive urgency [I16.0]  Suspected cerebrovascular accident (CVA) [R09.89]       Precautions:        ASSESSMENT  Based on the objective data described below, the patient presents at baseline with self-care and mobility (using [employer]). Pt still with c/o of \"not feeling right. \"  MRI was negative. She was wondering if it's because of her recent eye surgery or hadn't been up much in the ER. Overall, feel pt is safe to return home. No further recommendations at this time. Current Level of Function (ADLs/self-care): supervision to mod I    Functional Outcome Measure: The patient scored 66/66 on the Fugl-Odell outcome measure. Other factors to consider for discharge: lives alone, son lives close by     PLAN :  Recommend with staff:     Recommendation for discharge: (in order for the patient to meet his/her long term goals)  No skilled occupational therapy/ follow up rehabilitation needs identified at this time. This discharge recommendation:  Has been made in collaboration with the attending provider and/or case management    IF patient discharges home will need the following DME: none       SUBJECTIVE:   Patient stated I still feel not right.     OBJECTIVE DATA SUMMARY:   HISTORY:   Past Medical History:   Diagnosis Date    DDD (degenerative disc disease)     Duodenal ulcer 1976    FH: leukemia     Headache(784.0)     Hypercholesterolemia     Osteopenia     Osteoporosis     Rectal polyp     Subarachnoid hemorrhage ([facility] [address] 1983     Past Surgical History:   Procedure Laterality Date    HX CYSTOCELE REPAIR      HX HYSTERECTOMY      2/2 fibroids    HX MOHS PROCEDURES      HX ORTHOPAEDIC      left foot surgery    HX UROLOGICAL      bladder surgery    REPAIR OF RECTOCELE         Prior Level of Function/Environment/Context:   Expanded or extensive additional review of patient history:   Home Situation  Home Environment: Private residence  # Steps to Enter: 2  One/Two Story Residence: Other (Comment)(1-story with basement)  Living Alone: Yes  Support Systems: Family member(s)  Patient Expects to be Discharged to[de-identified] Private residence  Current DME Used/Available at Home: Cane, quad, Cane, straight  Tub or Shower Type: Shower    Hand dominance: Left    EXAMINATION OF PERFORMANCE DEFICITS:  Cognitive/Behavioral Status:  Neurologic State: Alert  Orientation Level: Oriented X4  Cognition: Appropriate decision making        Safety/Judgement: Awareness of environment    Skin: fragile skin    Edema: none noted    Hearing:       Vision/Perceptual:                                     Range of Motion:    AROM: Within functional limits  PROM: Within functional limits                      Strength:    Strength: Within functional limits                Coordination:  Coordination: Within functional limits  Fine Motor Skills-Upper: Left Intact; Right Intact    Gross Motor Skills-Upper: Left Intact; Right Intact    Tone & Sensation:    Tone: Normal  Sensation: Intact                      Balance:  Sitting: Intact  Standing: Intact; Without support; With support    Functional Mobility and Transfers for ADLs:  Bed Mobility:  Rolling: Independent  Supine to Sit: Independent  Sit to Supine: Independent  Scooting: Independent    Transfers:  Sit to Stand: Modified independent  Stand to Sit: Modified independent  Bed to Chair: Modified independent  Bathroom Mobility: Supervision/set up  Toilet Transfer : Supervision    ADL Assessment:  Feeding: Independent    Oral Facial Hygiene/Grooming: Independent    Bathing: Independent    Upper Body Dressing: Independent    Lower Body Dressing: Independent    Toileting: Independent                ADL Intervention and task modifications:         Cognitive Retraining  Safety/Judgement: Awareness of environment      Functional Measure:  Fugl-Odell Assessment of Motor Recovery after Stroke:     Reflex Activity  Flexors/Biceps/Fingers: Can be elicited  Extensors/Triceps: Can be elicited  Reflex Subtotal: 4    Volitional Movement Within Synergies  Shoulder Retraction: Full  Shoulder Elevation: Full  Shoulder Abduction (90 degrees): Full  Shoulder External Rotation: Full  Elbow Flexion: Full  Forearm Supination: Full  Shoulder Adduction/Internal Rotation: Full  Elbow Extension: Full  Forearm Pronation: Full  Subtotal: 18    Volitional Movement Mixing Synergies  Hand to Lumbar Spine: Full  Shoulder Flexion (0-90 degrees): Full  Pronation-Supination: Full  Subtotal: 6    Volitional Movement With Little or No Synergy  Shoulder Abduction (0-90 degrees): Full  Shoulder Flexion ( degrees): Full  Pronation/Supination: Full  Subtotal : 6    Normal Reflex Activity  Biceps, Triceps, Finger Flexors: Full  Subtotal : 2    Upper Extremity Total   Upper Extremity Total: 36    Wrist  Stability at 15 Degree Dorsiflexion: Full  Repeated Dorsiflexion/ Volar Flexion: Full  Stability at 15 Degree Dorsiflexion: Full  Repeated Dorsiflexion/ Volar Flexion: Full  Circumduction: Full  Wrist Total: 10    Hand  Mass Flexion: Full  Mass Extension: Full  Grasp A: Full  Grasp B: Full  Grasp C: Full  Grasp D: Full  Grasp E: Full  Hand Total: 14    Coordination/Speed  Tremor: None  Dysmetria: None  Time: <1s  Coordination/Speed Total : 6    Total A-D  Total A-D (Motor Function): 66/66         This is a reliable/valid measure of arm function after a neurological event. It has established value to characterize functional status and for measuring spontaneous and therapy-induced recovery; tests proximal and distal motor functions. Fugl-Odell Assessment  UE scores recorded between five and 30 days post neurologic event can be used to predict UE recovery at six months post neurologic event.   Severe = 0-21 points   Moderately Severe = 22-33 points   Moderate = 34-47 points Mild = 48-66 points  NANCY Herrera, MARGI Leone, & BRANDI DelR eal (1992). Measurement of motor recovery after stroke: Outcome assessment and sample size requirements. Stroke, 23, pp. 0060-3315.   --------------------------------------------------------------------------------------------------------------------------------------------------------------------  MCID:  Stroke:   Bennie Hare et al, 2001; n = 171; mean age 79 (6) years; assessed within 16 (12) days of stroke, Acute Stroke)  FMA Motor Scores from Admission to Discharge   10 point increase in FMA Upper Extremity = 1.5 change in discharge FIM   10 point increase in FMA Lower Extremity = 1.9 change in discharge FIM  MDC:   Stroke:   Jeffrey Pierson et al, 2008, n = 14, mean age = 59.9 (14.6) years, assessed on average 14 (6.5) months post stroke, Chronic Stroke)   FMA = 5.2 points for the Upper Extremity portion of the assessment       Occupational Therapy Evaluation Charge Determination   History Examination Decision-Making   LOW Complexity : Brief history review  LOW Complexity : 1-3 performance deficits relating to physical, cognitive , or psychosocial skils that result in activity limitations and / or participation restrictions  LOW Complexity : No comorbidities that affect functional and no verbal or physical assistance needed to complete eval tasks       Based on the above components, the patient evaluation is determined to be of the following complexity level: LOW   Pain Rating:  No c/o pain    Activity Tolerance:   Good  Please refer to the flowsheet for vital signs taken during this treatment. After treatment patient left in no apparent distress:    Supine in bed and Call bell within reach    COMMUNICATION/EDUCATION:   The patients plan of care was discussed with: Physical therapist and Registered nurse.      Thank you for this referral.  Rhonda Carty, OTR/L  Time Calculation: 25 mins

## 2020-09-02 NOTE — PROGRESS NOTES
PHYSICAL THERAPY EVALUATION/DISCHARGE  Patient: Thom Bingham (89 y.o. female)  Date: 9/2/2020  Primary Diagnosis: Suspected cerebrovascular accident [R09.89]  Hypertensive urgency [I16.0]  Suspected cerebrovascular accident (CVA) [R09.89]       Precautions: fall         ASSESSMENT  Based on the objective data described below, the patient presents with modified independent with ambulation with and without assistive device occasionally use quad cane and modified independent with all functional mobility. Patient lives alone, son lives nearby and check on the patient regularly. Has all DME at home advised to use rolling walker if she feels unsteady since she already have it at home. Patient scheduled to be discharge today. Reviewed all safety precaution and home exercise program with the patient, verbalized understanding, clear to go home per Physical Therapy perspective. Skilled physical therapy is not indicated at this time. Functional Outcome Measure: The patient scored Total: 52/56 on the Ghosh Balance Assessment which is indicative of low fall risk. Other factors to consider for discharge: lives alone     Further skilled acute physical therapy is not indicated at this time. PLAN :  Recommendation for discharge: (in order for the patient to meet his/her long term goals)  No skilled physical therapy/ follow up rehabilitation needs identified at this time. This discharge recommendation:  Has been made in collaboration with the attending provider and/or case management    IF patient discharges home will need the following DME: patient owns DME required for discharge         SUBJECTIVE:   Patient stated Sanchez Huerta will be going home today.     OBJECTIVE DATA SUMMARY:   HISTORY:    Past Medical History:   Diagnosis Date    DDD (degenerative disc disease)     Duodenal ulcer 1976    FH: leukemia     Headache(784.0)     Hypercholesterolemia     Osteopenia     Osteoporosis     Rectal polyp     [facility]ach[facility]oid hemorrhage ([facility] [address] 1983     Past Surgical History:   Procedure Laterality Date    HX CYSTOCELE REPAIR      HX HYSTERECTOMY      2/2 fibroids    HX MOHS PROCEDURES      HX ORTHOPAEDIC      left foot surgery    HX UROLOGICAL      bladder surgery    REPAIR OF RECTOCELE         Prior level of function: Modified Independent community ambulator with and without assistive device. Personal factors and/or comorbidities impacting plan of care:     Home Situation  Home Environment: Private residence  # Steps to Enter: 2  One/Two Story Residence: Other (Comment)(1-story with basement)  Living Alone: Yes  Support Systems: Family member(s)  Patient Expects to be Discharged to[de-identified] Private residence  Current DME Used/Available at Home: Cane, quad, Cane, straight  Tub or Shower Type: Shower    EXAMINATION/PRESENTATION/DECISION MAKING:   Critical Behavior:  Neurologic State: Alert  Orientation Level: Oriented X4  Cognition: Appropriate decision making  Safety/Judgement: Awareness of environment  Hearing:       Range Of Motion:  AROM: Within functional limits           PROM: Within functional limits           Strength:    Strength: Within functional limits                    Tone & Sensation:   Tone: Normal              Sensation: Intact               Coordination:  Coordination: Within functional limits  Vision:      Functional Mobility:  Bed Mobility:  Rolling: Independent  Supine to Sit: Independent  Sit to Supine: Independent  Scooting: Independent  Transfers:  Sit to Stand: Modified independent  Stand to Sit: Modified independent  Stand Pivot Transfers: Modified independent     Bed to Chair: Modified independent              Balance:   Sitting: Intact  Standing: Intact; Without support; With support  Ambulation/Gait Training:  Distance (ft): 100 Feet (ft)  Assistive Device: Cane, quad;Gait belt  Ambulation - Level of Assistance: Modified independent     Gait Description (WDL): Within defined limits Therapeutic Exercises:    Instructed patient to continue active range of motion exercise on both legs while up on chair or on bed. Functional Measure  Ghosh Balance Test:    Sitting to Standing: 3  Standing Unsupported: 4  Sitting with Back Unsupported: 4  Standing to Sittin  Transfers: 4  Standing Unsupported with Eyes Closed: 3  Standing Unsupported with Feet Together: 4  Reach Forward with Outstretched Arm: 4   Object: 4  Turn to Look Over Shoulders: 4  Turn 360 Degrees: 4  Alternate Foot on Step/Stool: 4  Standing Unsupported One Foot in Front: 3  Stand on One Leg: 3  Total: 52/56         56=Maximum possible score;   0-20=High fall risk  21-40=Moderate fall risk   41-56=Low fall risk        Physical Therapy Evaluation Charge Determination   History Examination Presentation Decision-Making   LOW Complexity : Zero comorbidities / personal factors that will impact the outcome / POC LOW Complexity : 1-2 Standardized tests and measures addressing body structure, function, activity limitation and / or participation in recreation  LOW Complexity : Stable, uncomplicated  Other outcome measures barthel  LOW       Based on the above components, the patient evaluation is determined to be of the following complexity level: LOW     Pain Ratin/10    Activity Tolerance:   Good  Please refer to the flowsheet for vital signs taken during this treatment. After treatment patient left in no apparent distress:   Sitting in chair and Call bell within reach    COMMUNICATION/EDUCATION:   The patients plan of care was discussed with: Occupational therapist and Registered nurse. Patient was educated regarding her deficit(s) of dizziness as this relates to her diagnosis of CVA. She demonstrated Excellent understanding as evidenced by recall. Patient and/or family was verbally educated on the BE FAST acronym for signs/symptoms of CVA and TIA.  Informed patient to refer to the Stroke Binder for further BE FAST information. All questions answered with patient indicating good. understanding. Fall prevention education was provided and the patient/caregiver indicated understanding. Thank you for this referral.  Placido Helton, PT,WCC.    Time Calculation: 28 mins

## 2020-09-02 NOTE — ED NOTES
PT/OT @ bedside to clear pt for d/c. Pt aware she is being d/c- stated her ride will not be here until \"later this afternoon. \"

## 2020-09-02 NOTE — ROUTINE PROCESS
Bedside and Verbal shift change report given to Harman (oncoming nurse) by Reyna Montemayor (offgoing nurse). Report included the following information SBAR, Kardex, Procedure Summary, Intake/Output and MAR.

## 2020-09-02 NOTE — PROCEDURES
Handy Cook [place] 79  EEG    Name:  Bello Doe  MR#:  [id number]  :  1930  ACCOUNT #:  [de-identified]  DATE OF SERVICE:  2020    REFERRING PROVIDER:  Jax Gutiérrez MD    CLINICAL HISTORY:  An EEG is requested on this 80year-old to evaluate for epileptiform abnormalities. MEDICATIONS:  None. EEG REPORT:  This tracing is obtained during the awake state. During wakefulness, there are intermittent runs of posteriorly dominant and symmetrical low-to-medium amplitude 8 cycle per second activities which attenuate with eye opening. Lower voltage faster frequency activities are seen symmetrically over the anterior head regions. Hyperventilation not performed. Intermittent photic stimulation little alters the tracing. Sleep is not attained. IMPRESSION:  This EEG recorded during the awake state is normal.  No epileptiform abnormalities are seen.       Luis Dominguez MD      [id number]  D:  2020 16:08  T:  2020 17:08  JOB #:  [id number]

## 2020-09-02 NOTE — PROGRESS NOTES
9/2/2020  1:53 PM  Case management note    obs letters educated, code 40 educated. Letter signed and placed in chart.   Michael Huizar

## 2020-09-02 NOTE — PROGRESS NOTES
[facility] and [address] at [facility] at [facility] [address], [address] [facility] 16 [address]  [phone] Office  [phone] Facsimile           Referring: Dr. Iris Virk    Chief Complaint   Patient presents with    Dizziness     80year-old lady were asked to see in neurologic consultation regarding dizziness. She says for the last several weeks she has had dizziness described as lightheadedness when she stands. She notes that if she is seated or laying down she has no symptoms. When she goes from a seated position to a standing position she has about 30 seconds or so where she feels lightheaded and imbalanced. 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 She has not lost consciousness. She denies any vertigo. No diplopia perioral numbness difficulty speech language swallowing or focal weakness. Never had this before. She was brought into the emergency department and she has been evaluated for possibility of a posterior circulation infarct which is completely appropriate. In reviewing her images today she had normal CTA and CTA of the head for age. Her MRI of the brain again age-related changes but nothing acute. She is on aspirin 81 mg now. She has not been ill. She has had no fever chills nausea vomiting. She has no nausea or vomiting with this lightheadedness. She does not get her symptomology when bending over or rolling over in bed or just turning her head.   It is only with standing    Past Medical History:   Diagnosis Date    DDD (degenerative disc disease)     Duodenal ulcer 1976    FH: leukemia     Headache(784.0)     Hypercholesterolemia     Osteopenia     Osteoporosis     Rectal polyp     Subarachnoid hemorrhage ([facility] [address]        Past Surgical History:   Procedure Laterality Date    HX CYSTOCELE REPAIR      HX HYSTERECTOMY      2/2 fibroids    HX MOHS PROCEDURES      HX ORTHOPAEDIC      left foot surgery    HX UROLOGICAL      bladder surgery    REPAIR OF RECTOCELE         Current Facility-Administered Medications   Medication Dose Route Frequency Provider Last Rate Last Dose    sodium chloride (NS) flush 5-40 mL  5-40 mL IntraVENous Q8H Laura Burris DO   10 mL at 20 0801    sodium chloride (NS) flush 5-40 mL  5-40 mL IntraVENous PRN Jazzy Woodson DO        enoxaparin (LOVENOX) injection 40 mg  40 mg SubCUTAneous DAILY Laura Burris DO   40 mg at 20 0800    aspirin chewable tablet 81 mg  81 mg Oral DAILY Jazzy Woodson DO   81 mg at 20 0801    cholecalciferol (VITAMIN D3) (1000 Units /25 mcg) tablet 2 Tab  2 Tab Oral DAILY Laura Burris DO        difluprednate (DUREZOL) 0.05 % ophthalmic emulsion 1 Drop (Patient Supplied)  1 Drop Left Eye BID Laura Burris DO   1 Drop at 20 0900    timolol (TIMOPTIC) 0.5 % ophthalmic solution 1 Drop  1 Drop Right Eye DAILY Laura Burris DO         Current Outpatient Medications   Medication Sig Dispense Refill    difluprednate (DurezoL) 0.05 % ophthalmic emulsion Administer 1 Drop to left eye two (2) times a day.  cholecalciferol, vitamin D3, (Vitamin D3) 50 mcg (2,000 unit) tab Take 1 Tab by mouth daily.  timolol (TIMOPTIC) 0.5 % ophthalmic solution Administer 1 Drop to right eye daily.           No Known Allergies    Social History     Tobacco Use    Smoking status: Former Smoker     Packs/day: 0.50     Years: 20.00     Pack years: 10.00     Last attempt to quit: 7/15/1976     Years since quittin.1   Substance Use Topics    Alcohol use: No    Drug use: Not on file       Family History   Problem Relation Age of Onset    Cancer Sister leukemia    Cancer Brother         leukemia       Review of Systems  Pertinent positives and negatives as noted with remainder of comprehensive review negative      Examination  Visit Vitals  /70   Pulse 85   Temp 97.6 °F (36.4 °C)   Resp 24   Ht 5' 5\" (1.651 m)   Wt 56.3 kg (124 lb 1.9 oz)   SpO2 94%   BMI 20.65 kg/m²   Extended / Orthostatic Vitals:  Patient Position 2: Sitting  BP 2: 153/87  Patient Position 3: Standing  BP 3: 106/68    Pleasant, well appearing. Dress and grooming are appropriate. No scleral icterus is present. Oropharynx is clear. Supple neck without bruit appreciated. Heart regular. Pulses are symmetrical.  No edema in the lower extremities. Neurologically, she is awake, alert, and oriented with normal speech and language. Her cognition is normal.    Intact cranial nerves 2-12 with the exception of some slight ptosis of the left lid which she has had since her glaucoma surgery. No nystagmus. Visual fields full to confrontation. Disk margins are flat bilaterally. She has normal bulk and tone. She has no abnormal movement. She has no pronation or drift. She generates full strength in the upper and lower extremities to direct confrontational testing. Reflexes are symmetrical in the upper and lower extremities bilaterally. No pathologic reflexes are elicited. .  Finger nose finger and rapid alternating movements are normal.  No sensory deficit to primary modalities. Impression/Plan  Pleasant 80year-old lady with complaints of dizziness described as lightheadedness and imbalance when she stands. No vertigo. Her examination and her work-up has been unrevealing. This is likely orthostasis versus balance instability which may be contributed to by her decreased vision in her left eye following her glaucoma surgery accompanied by routine aging i.e. multifactorial.  Reassuring again is her exam and her work-up.   She did have orthostatics this morning demonstrating a prominent drop in again perhaps letting her pressure be more liberalized is an order but again will defer that to the medicine team.    From my standpoint we will be happy to follow-up again as needed. Candance Peek, MD    This note was created using voice recognition software. Despite editing, there may be syntax errors.

## 2020-09-02 NOTE — TELEPHONE ENCOUNTER
Call made to patient to set up virtual follow up appointment. Patient states that she is still in the ER and will have to call back to schedule her appointment.

## 2020-09-02 NOTE — DISCHARGE INSTRUCTIONS
HOME DISCHARGE INSTRUCTIONS    Joaquin Garrison / [id number] : 1930    Admission date: 2020 Discharge date: 2020     Please bring this form with you to show your care provider at your follow-up appointment. Primary care provider:  Braeden Franklin MD    Discharging provider:  Musa Cantu DO  - Family Medicine Resident  Dr. Collin Jaime - Attending, Family Medicine     You have been admitted to the hospital with the following diagnoses:    ACUTE DIAGNOSES:  Suspected cerebrovascular accident [R09.89]  Hypertensive urgency [I16.0]  Suspected cerebrovascular accident (CVA) [R09.89]  . . . . . . . . . . . . . . . . . . . . . . . . . . . . . . . . . . . . . . . . . . . . . . . . . . . . . . . . . . . . . . . . . . . . . . . [facility] FOLLOW-UP CARE RECOMMENDATIONS:    Appointments  Follow-up Information     Follow up With Specialties Details Why Contact Info    Braeden Franklin MD Family [facility] should call you with an appointment time but please call at discharge to confirm. [address] [facility]  [phone]               Follow-up tests needed: Thyroid ultrasound for evaluation of nodularity, Blood pressure re-check    Pending test results: At the time of your discharge the following test results are still pending: Ammonia level. Please make sure you review these results with your outpatient follow-up provider(s). Specific symptoms to watch for: chest pain, shortness of breath, fever, chills, nausea, vomiting, diarrhea, change in mentation, falling, weakness, bleeding. DIET/what to eat:  Regular Diet    ACTIVITY:  PT/OT per Home Health    Wound care: None    Equipment needed:  None    What to do if new or unexpected symptoms occur? If you experience any of the above symptoms (or should other concerns or questions arise after discharge) please call your primary care physician. Return to the emergency room if you cannot get hold of your doctor.     · It is very important that you keep your follow-up appointment(s). · Please bring discharge papers, medication list (and/or medication bottles) to your follow-up appointments for review by your outpatient provider(s). · Please check the list of medications and be sure it includes every medication (even non-prescription medications) that your provider wants you to take. · It is important that you take the medication exactly as they are prescribed. · Keep your medication in the bottles provided by the pharmacist and keep a list of the medication names, dosages, and times to be taken in your wallet. · Do not take other medications without consulting your doctor. · If you have any questions about your medications or other instructions, please talk to your nurse or care provider before you leave the hospital.     Information obtained by:     I understand that if any problems occur once I am at home I am to contact my physician. These instructions were explained to me and I had the opportunity to ask questions. I understand and acknowledge receipt of the instructions indicated above. Physician's or R.N.'s Signature                                                                  Date/Time                                                                                                                                              Patient or Representative Signature                                                          Date/Time      Patient Education        Orthostatic Hypotension: Care Instructions  Your Care Instructions     Orthostatic hypotension is a quick drop in blood pressure. It happens when you get up from sitting or lying down. You may feel faint, lightheaded, or dizzy. When a person sits up or stands up, the body changes the way it pumps blood.  This can slow the flow of blood to the brain for a very short time. And that can make you feel lightheaded. Many medicines can cause this problem, especially in older people. Lack of fluids (dehydration) or illnesses such as diabetes or heart disease also can cause it. Follow-up care is a key part of your treatment and safety. Be sure to make and go to all appointments, and call your doctor if you are having problems. It's also a good idea to know your test results and keep a list of the medicines you take. How can you care for yourself at home? · Be safe with medicines. Call your doctor if you think you are having a problem with your medicine. You will get more details on the specific medicines your doctor prescribes. · If you feel dizzy or lightheaded, sit down or lie down for a few minutes. Or you can sit down and put your head between your knees. This will help your blood pressure go back to normal and help your symptoms go away. · Follow your doctor's suggestions for ways to prevent symptoms like dizziness. These suggestions may include:  ? Get up slowly from bed or after sitting for a long time. If you are in bed, roll to your side and swing your legs over the edge of the bed and onto the floor. Push your body up to a sitting position. Wait for a while before you slowly stand up.  ? Add more salt to your diet, if your doctor recommends it. ? Drink plenty of fluids, enough so that your urine is light yellow or clear like water. Choose water and other caffeine-free clear liquids. If you have kidney, heart, or liver disease and have to limit fluids, talk with your doctor before you increase the amount of fluids you drink. ? Avoid or limit alcohol to 2 drinks a day for men and 1 drink a day for women. Alcohol may interfere with your medicine. In addition, alcohol can make your low blood pressure worse by causing your body to lose water. ? Avoid or limit caffeine. Caffeine can cause your body to lose water. ?  Wear compression stockings to help improve blood flow. When should you call for help? [id number] anytime you think you may need emergency care. For example, call if:  · You passed out (lost consciousness). Watch closely for changes in your health, and be sure to contact your doctor if:  · You do not get better as expected. Where can you learn more? Go to http://www.[employer].com/  Enter V984 in the search box to learn more about \"Orthostatic Hypotension: Care Instructions. \"  Current as of: December 16, 2019               Content Version: 12.5  © 1700-0286 [employer]. Care instructions adapted under license by [employer] (which disclaims liability or warranty for this information). If you have questions about a medical condition or this instruction, always ask your healthcare professional. [address] any warranty or liability for your use of this information.

## 2020-09-02 NOTE — DISCHARGE SUMMARY
[address] [facility] [address] [place] [address]   Office [phone]  Fax [phone]       Discharge / Transfer / Off-Service Note     Name: Natalia Marr MRN: [id number]  Sex: Female   YOB: 1930  Age: 80 y.o. PCP: Bria Leggett MD     Date of admission: 9/1/2020  Date of discharge/transfer: 9/2/2020    Attending physician at admission: Raisa Metzger MD     Attending physician at discharge/transfer: Dr. Royal Browning    Resident physician at discharge/transfer: Jacquelyn Romero DO     Consultants during hospitalization  IP CONSULT TO [facility] TO [facility]     Admission diagnoses   Suspected cerebrovascular accident [R09.89]  Hypertensive urgency [I16.0]  Suspected cerebrovascular accident (CVA) [R09.89]    Recommended follow-up after discharge  1. PCP: Bria Leggett MD    Things to follow up on with PCP:  Orthostasis  Thyroid nodularity seen on CTA  Elevated BP    History of Present Illness  Per admitting provider,     Natalia Marr is a 80 y.o. female with known subarachnoid hemorrhage (1983), DDD, Vitamin D deficiency, osteopenia, hypercholesteremia, open angle glaucoma who presents to the ER complaining of intermittent positional dizziness and elevated blood pressure this morning. She measures her BP routinely and recalls a diastolic pressure of 563 this morning prompting her to come to the ED, no history of HTN other than white coat syndrome. The dizziness is worse when going from sitting/lying to standings and resolves in about 30 seconds. Endorses a sense of imbalance but denies sensation that the room is spinning. Denies chest pain, SOB, palpitations, cough, diarrhea, nausea, vomiting, head ache, vision changes, syncopal episodes. She denies decreased appetite and reports she drinks 4-8 glasses of water and milk a day. She says her vision in the left eye has been gradually improving since surgery but is not perfect yet.   Typically ambulates independently but has been using cane since surgery due to dizziness. CT non-contrast of head shows no acute intracranial findings. HOSPITAL COURSE    Orthostatic Hypotension:  C/o intermittent positional dizziness per HPI. Supine /86, Sitting /87, Standing /68. Medications reviewed and appear noncontributory. Patient denies low appetite, fluid intake, diarrhea, polyuria, vomiting. PT/OT recommend discharge home with no additional therapy. - Follow up with PCP  - Patient provided instructions with information and precautions to take regarding orthostatic hypotension    CVA/Stroke Rule out: Symptoms per HPI likely 2/2 orthostasis versus balance instability due to recovering vision in left eye. 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 CT non-contrast of head shows no intracranial findings. CTA head and neck shows left vertebral artery stenosis and mild right carotid stenosis. MRI shows age-related changes only. EEG unremarkable. Troponins neg x3, EKG and echo unremarkable. TSH, INR, CBC,CMP, Mg, Phos wnl. Ammonia pending. UA and UDS unremarkable. Negative [place] Hallpike Maneuver. Orthostatics positive. Neuro consulted and signed off on the patient. PT/OT recommend discharge to home with no additional therapy. - Follow up with PCP in regards to orthostasis  - Follow up with opthalmology as scheduled for evaluation of vision impairment/recovery    Elevated BP: No history of hypertension.  Patient reports white coat syndrome. Measures BP at home and reports significant elevation day of admission with .  BP on admission 213/120 which normalized to 140-150s/70s-90s with labetalol 10mg IV.  - At this time 102/65  - No home medications indicated at this point particularly with orthostasis  - Continue monitoring BP at home and follow up with PCP       Hyperlipidemia: Last lipid panel on 4/18/2017 and values were total cholesterol 204, , HDL 63, VLDL 21, .  No home medications.     Open-angle glaucoma: Bilateral. S/p left eye surgery on 7/15 by Dr. Darling Mcallister. Reports gradual improvement of vision since surgery but maintains some deficits. - Continue timolol drops  - Continue difluprednate in left eye   - Follow up with opthalmology as scheduled     Vitamin D Deficiency  - Continue home Vitamin D 50 mcg po daily     Osteopenia: Likely secondary to vit D def.  No fractures reported. - Continue home Vitamin D 50 mcg po daily     Degenerative Disc Disease: per problem list.  No complaints.     Thyroid Nodularity: Incidental finding on CTA head/neck 9/1, largest nodule in the right 1.5 cm. TSH 1.6  - Ultrasound follow up outpatient if clinically relevant         Physical exam at discharge:    Vitals Reviewed. Visit Vitals  /65   Pulse 84   Temp 97.6 °F (36.4 °C)   Resp 14   Ht 5' 5\" (1.651 m)   Wt 124 lb 1.9 oz (56.3 kg)   SpO2 95%   BMI 20.65 kg/m²        General Oriented to person, place, and time and well-developed. Appears well-nourished, no distress. Not diaphoretic. HEENT Head atraumatic. Negative [place] Hallpike Maneuver. Neck No thyromegaly present. No cervical adenopathy. Cardio Normal rate, regular rhythm. Exam reveals no gallop and no friction rub. No murmur heard. Pulmonary Effort normal and breath sounds normal. No respiratory distress. No wheezes, no rales. Abdominal Soft. Bowel sounds normal. No distension. No tenderness. Extremities No edema of lower extremities. Distal pulses intact. Neurological Alert and oriented to person, place, and time. No cranial deficits. Strength 5/5. Sensation intact. Heel-to-shin test normal.     Dermatology Skin is warm and dry. No rash noted. No erythema or pallor.     Psychiatric Affect and judgment normal.        Condition at discharge: Stable    Labs  Recent Labs     09/02/20  0430 09/01/20  1215   WBC 10.8 9.7   HGB 14.0 15.0   HCT 44.0 47.2*   PLT 215 228     Recent Labs     09/02/20  0430 09/01/20  1215    135*   K 3.4* 3.6    98   CO2 29 29   BUN 23* 22*   CREA 0.76 0.80   GLU 94 92   CA 8.8 9.1   MG  --  2.4   PHOS  --  3.4     Recent Labs     09/01/20  1215   ALT 18      TBILI 0.6   TP 8.1   ALB 3.6   GLOB 4.5*     Recent Labs     09/02/20  0430 09/01/20  1903 09/01/20  1215   TROIQ <0.05 <0.05 <0.05   INR  --   --  1.0   PTP  --   --  10.5       Microbiology  Results     Procedure Component Value Units Date/Time    URINE CULTURE HOLD SAMPLE [[id number]] Collected:  09/02/20 0806    Order Status:  Completed Specimen:  Serum Updated:  09/02/20 0815     Urine culture hold       Urine on hold in Microbiology dept for 2 days. If unpreserved urine is submitted, it cannot be used for addtional testing after 24 hours, recollection will be required. URINE CULTURE HOLD SAMPLE [[id number]] Collected:  09/01/20 1256    Order Status:  Completed Specimen:  Urine from Serum Updated:  09/01/20 1319     Urine culture hold       Urine on hold in Microbiology dept for 2 days. If unpreserved urine is submitted, it cannot be used for addtional testing after 24 hours, recollection will be required. Procedures / Diagnostic Studies  Echo Results  (Last 48 hours)    None         Result status: Final result    · LV: Estimated LVEF is 60 - 65%. Visually measured ejection fraction. Normal cavity size and systolic function (ejection fraction normal). Mild concentric hypertrophy. Wall motion: normal.     Imaging  Mri Brain W Wo Cont    Result Date: 9/1/2020  IMPRESSION: 1. No acute or subacute ischemia or other acute intracranial abnormality. 2. Microvascular ischemic and other age-related change. Ct Head Wo Cont    Result Date: 9/1/2020  IMPRESSION: No acute intracranial process identified    Cta Head Neck W Cont    Result Date: 9/1/2020  IMPRESSION: 1. Approximately 20% stenosis right internal carotid artery origin.  2. Mild/moderate stenosis origin nondominant left vertebral artery at its origin from the aorta. 3. No embolus, occlusion or acute arterial abnormality demonstrated. 4. Increased markings right greater than left apical lung suggesting nonspecific inflammatory process. Correlate clinically. 5. Thyroid nodularity with largest nodule in the right 1.5 cm. Consider nonemergent ultrasound follow-up if not previously evaluated. Likely goiter. Chronic diagnoses   Problem List as of 9/2/2020 Date Reviewed: 7/13/2020          Codes Class Noted - Resolved    Thyroid nodule ICD-10-CM: E04.1  ICD-9-CM: 241.0  9/2/2020 - Present        Suspected cerebrovascular accident (CVA) ICD-10-CM: R09.89  ICD-9-CM: 785.9  9/2/2020 - Present        Hypertensive urgency ICD-10-CM: I16.0  ICD-9-CM: 401.9  9/1/2020 - Present        * (Principal) Suspected cerebrovascular accident ICD-10-CM: R09.89  ICD-9-CM: 785.9  9/1/2020 - Present        Bilateral pseudophakia ICD-10-CM: Z96.1  ICD-9-CM: V43.1  9/11/2017 - Present        Primary open angle glaucoma of both eyes, moderate stage ICD-10-CM: B21.8309  ICD-9-CM: 365.11, 365.72  9/11/2017 - Present        Vitamin D deficiency (Chronic) ICD-10-CM: E55.9  ICD-9-CM: 268.9  7/15/2010 - Present        Elevated blood pressure reading ICD-10-CM: R03.0  ICD-9-CM: 796.2  7/15/2010 - Present    Overview Signed 10/18/2010 11:31 AM by Josh Giron MD     White coat Sx. Normal outside office. Insomnia (Chronic) ICD-10-CM: G47.00  ICD-9-CM: 780.52  7/15/2010 - Present        Osteopenia (Chronic) ICD-10-CM: M85.80  ICD-9-CM: 733.90  Unknown - Present        Hypercholesterolemia (Chronic) ICD-10-CM: E78.00  ICD-9-CM: 272.0  Unknown - Present        Rectal polyp (Chronic) ICD-10-CM: K62.1  ICD-9-CM: 569.0  Unknown - Present    Overview Signed 10/18/2010 11:30 AM by Josh Giron MD     2008, Dr. Calvin Guzman.              DDD (degenerative disc disease) (Chronic) ICD-10-CM: PKY9424  ICD-9-CM: 722.6  Unknown - Present        FH: leukemia ICD-10-CM: Z80.6  ICD-9-CM: V16.6  Unknown - Present        Duodenal ulcer - 1976 ICD-10-CM: 532  ICD-9-CM: 092  Unknown - Present        Subarachnoid hemorrhage - 1983 ICD-10-CM: I60.9  ICD-9-CM: 442  Unknown - Present        RESOLVED: Headache(784.0) (Chronic) ICD-10-CM: R51  ICD-9-CM: 784.0  Unknown - 9/2/2020              Discharge/Transfer Medications  Discharge Medication List as of 9/2/2020 12:10 PM      CONTINUE these medications which have NOT CHANGED    Details   difluprednate (DurezoL) 0.05 % ophthalmic emulsion Administer 1 Drop to left eye two (2) times a day., Historical Med      cholecalciferol, vitamin D3, (Vitamin D3) 50 mcg (2,000 unit) tab Take 1 Tab by mouth daily. , Historical Med      timolol (TIMOPTIC) 0.5 % ophthalmic solution Administer 1 Drop to right eye daily. , Historical Med              Diet:  Regular diet. Activity:  As tolerated    [place] [facility] instructions to patient/family  Please seek medical attention for any new or worsening symptoms particularly fever, chest pain, shortness of breath, abdominal pain, nausea, vomiting    Follow up plans/appointments  Follow-up Information     Follow up With Specialties Details Why Contact Info    David Lin MD Family Medicine Call in 1 week Clinic should call you with an appointment time but please call at discharge to confirm.  [address] Sylwia Kramer DO  Family Medicine Resident       For Billing    Chief Complaint   Patient presents with   [facility] Problems  Date Reviewed: 7/13/2020          Codes Class Noted POA    Thyroid nodule ICD-10-CM: E04.1  ICD-9-CM: 241.0  9/2/2020 Unknown        Suspected cerebrovascular accident (CVA) ICD-10-CM: R09.89  ICD-9-CM: 785.9  9/2/2020 Unknown        Hypertensive urgency ICD-10-CM: I16.0  ICD-9-CM: 401.9  9/1/2020 Unknown        * (Principal) Suspected cerebrovascular accident ICD-10-CM: R09.89  ICD-9-CM: 785.9  9/1/2020 Unknown        Primary open angle glaucoma of both eyes, moderate stage ICD-10-CM: L78.1110  ICD-9-CM: 365.11, 365.72  9/11/2017 Yes        Vitamin D deficiency (Chronic) ICD-10-CM: E55.9  ICD-9-CM: 268.9  7/15/2010 Yes        Elevated blood pressure reading ICD-10-CM: R03.0  ICD-9-CM: 796.2  7/15/2010 Yes    Overview Signed 10/18/2010 11:31 AM by Jaime Cotton MD     White coat Sx. Normal outside office.              Osteopenia (Chronic) ICD-10-CM: M85.80  ICD-9-CM: 733.90  Unknown Yes        Hypercholesterolemia (Chronic) ICD-10-CM: E78.00  ICD-9-CM: 272.0  Unknown Yes

## 2020-09-02 NOTE — TELEPHONE ENCOUNTER
----- Message from Jacquelyn Romero DO sent at 9/2/2020 11:37 AM EDT -----  Regarding: Follow Up - Unable to contact via phone  Hello! This patient was admitted for work up of positional dizziness and needs an appointment with her PCP, Dr. Kacey Bradley. I was unable to get through to the clinic on two different lines. Do you mind scheduling her for a virtual visit this week? Thank you!   Jacquelyn Romero DO

## 2020-10-30 ENCOUNTER — OFFICE VISIT (OUTPATIENT)
Dept: ENT CLINIC | Age: 85
End: 2020-10-30
Payer: MEDICARE

## 2020-10-30 VITALS
BODY MASS INDEX: 20.66 KG/M2 | TEMPERATURE: 97.9 F | SYSTOLIC BLOOD PRESSURE: 140 MMHG | OXYGEN SATURATION: 97 % | RESPIRATION RATE: 18 BRPM | WEIGHT: 124 LBS | HEIGHT: 65 IN | HEART RATE: 70 BPM | DIASTOLIC BLOOD PRESSURE: 88 MMHG

## 2020-10-30 DIAGNOSIS — C44.41 BASAL CELL CARCINOMA (BCC) OF RIGHT SIDE OF NECK: Primary | ICD-10-CM

## 2020-10-30 PROCEDURE — 99204 OFFICE O/P NEW MOD 45 MIN: CPT | Performed by: OTOLARYNGOLOGY

## 2020-10-30 NOTE — PROGRESS NOTES
Subjective:    Everett Lebron   80 y.o.   12/8/1930     HPI     Location - right neck skin    Quality - skin CA    Severity -  moderate    Duration - few months    Timing - ongoing    Context - pt with hx BCC right posterior neck skin, had an excision before, but nodule recurred and subsequent bx showing recurrence of BCC, now sent for surgical excision; pt reports itching still at site    Modifying Features - none    Associated symptoms/signs - prior hx skin CA      Review of Systems  Review of Systems   Constitutional: Negative for chills and fever. HENT: Negative for ear pain, hearing loss, nosebleeds and tinnitus. Eyes: Negative for blurred vision and double vision. Respiratory: Negative for cough, sputum production and shortness of breath. Cardiovascular: Negative for chest pain and palpitations. Gastrointestinal: Negative for heartburn, nausea and vomiting. Musculoskeletal: Positive for joint pain and myalgias. Negative for neck pain. Skin: Positive for itching. Neurological: Negative for dizziness, speech change, weakness and headaches. Endo/Heme/Allergies: Negative for environmental allergies. Does not bruise/bleed easily. Psychiatric/Behavioral: Negative for memory loss. The patient does not have insomnia.           Past Medical History:   Diagnosis Date    DDD (degenerative disc disease)     Duodenal ulcer 1976    FH: leukemia     Headache(784.0)     Hypercholesterolemia     Osteopenia     Osteoporosis     Rectal polyp     Subarachnoid hemorrhage ([facility] [address] 1983     Past Surgical History:   Procedure Laterality Date    HX CYSTOCELE REPAIR      HX HYSTERECTOMY      2/2 fibroids    HX MOHS PROCEDURES      HX ORTHOPAEDIC      left foot surgery    HX UROLOGICAL      bladder surgery    REPAIR OF RECTOCELE        Family History   Problem Relation Age of Onset    Cancer Sister         leukemia    Cancer Brother         leukemia     Social History     Tobacco Use    Smoking status: Former Smoker     Packs/day: 0.50     Years: 20.00     Pack years: 10.00     Last attempt to quit: 7/15/1976     Years since quittin.3    Smokeless tobacco: Never Used   Substance Use Topics    Alcohol use: No      Prior to Admission medications    Medication Sig Start Date End Date Taking? Authorizing Provider   difluprednate (DurezoL) 0.05 % ophthalmic emulsion Administer 1 Drop to left eye two (2) times a day. Provider, Historical   cholecalciferol, vitamin D3, (Vitamin D3) 50 mcg (2,000 unit) tab Take 1 Tab by mouth daily. Provider, Historical   timolol (TIMOPTIC) 0.5 % ophthalmic solution Administer 1 Drop to right eye daily. 6/8/10   Provider, Historical        No Known Allergies      Objective:     Visit Vitals  BP (!) 140/88 (BP 1 Location: Left arm, BP Patient Position: Sitting)   Pulse 70   Temp 97.9 °F (36.6 °C) (Oral)   Resp 18   Ht 5' 5\" (1.651 m)   Wt 124 lb (56.2 kg)   SpO2 97%   BMI 20.63 kg/m²        Physical Exam  Vitals signs reviewed. Constitutional:       General: She is awake. She is not in acute distress. Appearance: Normal appearance. She is well-groomed and normal weight. HENT:      Head: Normocephalic and atraumatic. Jaw: There is normal jaw occlusion. No trismus, tenderness or malocclusion. Salivary Glands: Right salivary gland is not diffusely enlarged or tender. Left salivary gland is not diffusely enlarged or tender. Right Ear: Hearing, tympanic membrane, ear canal and external ear normal.      Left Ear: Hearing, tympanic membrane, ear canal and external ear normal.      Ears:      Cooper exam findings: does not lateralize. Right Rinne: AC > BC. Left Rinne: AC > BC. Nose: No nasal deformity, septal deviation or mucosal edema. Right Nostril: No epistaxis. Left Nostril: No epistaxis. Right Turbinates: Not enlarged, swollen or pale. Left Turbinates: Not enlarged, swollen or pale.       Right Sinus: No maxillary sinus tenderness or frontal sinus tenderness. Left Sinus: No maxillary sinus tenderness or frontal sinus tenderness. Mouth/Throat:      Lips: Pink. No lesions. Mouth: Mucous membranes are moist. No oral lesions. Dentition: Normal dentition. No dental caries. Tongue: No lesions. Palate: No mass and lesions. Pharynx: Oropharynx is clear. Uvula midline. No oropharyngeal exudate or posterior oropharyngeal erythema. Tonsils: No tonsillar exudate. 0 on the right. 0 on the left. Eyes:      General: Vision grossly intact. Extraocular Movements: Extraocular movements intact. Right eye: No nystagmus. Left eye: No nystagmus. Conjunctiva/sclera: Conjunctivae normal.      Pupils: Pupils are equal, round, and reactive to light. Neck:      Musculoskeletal: No edema or erythema. Thyroid: No thyroid mass, thyromegaly or thyroid tenderness. Trachea: Trachea and phonation normal. No tracheal tenderness or tracheal deviation. Comments: Area of prior bx right posterolateral neck skin  Cardiovascular:      Rate and Rhythm: Normal rate and regular rhythm. Pulmonary:      Effort: Pulmonary effort is normal. No respiratory distress. Breath sounds: No stridor. Musculoskeletal: Normal range of motion. General: No swelling or tenderness. Lymphadenopathy:      Cervical: No cervical adenopathy. Skin:     General: Skin is warm and dry. Findings: Lesion present. No rash. Neurological:      General: No focal deficit present. Mental Status: She is alert and oriented to person, place, and time. Mental status is at baseline. Cranial Nerves: Cranial nerves are intact. Coordination: Romberg sign negative. Gait: Gait is intact. Comments: Negative Hallpike   Psychiatric:         Mood and Affect: Mood normal.         Behavior: Behavior normal. Behavior is cooperative.          Assessment/Plan:     Encounter Diagnoses   Name Primary?  Basal cell carcinoma (BCC) of right side of neck Yes     Dermatology notes/reports reviewed with patient. Discussed office excision in slow Mohs technique with frozen section. Discussed reconstruction including primary closure, rotational flap, skin grafting, and secondary intention. Questions answered. Scheduled for Dec 1st in office. No orders of the defined types were placed in this encounter. Follow-up and Dispositions    · Return in 1 month (on 12/1/2020) for IOP skin cancer with frozen, 9:20am appt (will need to move the other patient at that time).

## 2020-10-30 NOTE — LETTER
10/30/20 Patient: Ledy Lemus YOB: 1930 Date of Visit: 10/30/2020 Zachery Holbrook MD 
[address] [address] VIA In Basket Dear Zachery Holbrook MD, Thank you for referring Ms. Cheryl Luong to [facility] E[facility], NOSE[facility]ROAT AND ALLERGY CARE for evaluation. My notes for this consultation are attached. If you have questions, please do not hesitate to call me. I look forward to following your patient along with you. Sincerely, Mary Mendez MD

## 2020-12-01 ENCOUNTER — OFFICE VISIT (OUTPATIENT)
Dept: ENT CLINIC | Age: 85
End: 2020-12-01
Payer: MEDICARE

## 2020-12-01 ENCOUNTER — TRANSCRIBE ORDER (OUTPATIENT)
Dept: LAB | Age: 85
End: 2020-12-01

## 2020-12-01 ENCOUNTER — HOSPITAL ENCOUNTER (OUTPATIENT)
Dept: LAB | Age: 85
Discharge: HOME | End: 2020-12-01
Payer: MEDICARE

## 2020-12-01 VITALS
DIASTOLIC BLOOD PRESSURE: 80 MMHG | OXYGEN SATURATION: 94 % | RESPIRATION RATE: 18 BRPM | SYSTOLIC BLOOD PRESSURE: 124 MMHG | TEMPERATURE: 97.1 F | HEIGHT: 65 IN | HEART RATE: 68 BPM | BODY MASS INDEX: 20.66 KG/M2 | WEIGHT: 124 LBS

## 2020-12-01 DIAGNOSIS — C44.41 BASAL CELL CARCINOMA (BCC) OF RIGHT SIDE OF NECK: Primary | ICD-10-CM

## 2020-12-01 DIAGNOSIS — C44.41 BASAL CELL CARCINOMA, SCALP/NECK: Primary | ICD-10-CM

## 2020-12-01 PROCEDURE — 88305 TISSUE EXAM BY PATHOLOGIST: CPT

## 2020-12-01 PROCEDURE — 11623 EXC S/N/H/F/G MAL+MRG 2.1-3: CPT | Performed by: OTOLARYNGOLOGY

## 2020-12-01 PROCEDURE — 88331 PATH CONSLTJ SURG 1 BLK 1SPC: CPT

## 2020-12-01 PROCEDURE — 12041 INTMD RPR N-HF/GENIT 2.5CM/<: CPT | Performed by: OTOLARYNGOLOGY

## 2020-12-01 NOTE — PROGRESS NOTES
Excision Malignant Neoplasm of Skin, Neck with Intermediate Closure    Informed consent was obtained. The area surrounding the right neck skin lesion was cleansed with alcohol then injected with  4 mL of 1% lidocaine with 1:100,000 parts epinephrine. We then prepped the area with betadine and draped in sterile fashion. A 15 blade was used to incise the skin surrounding the lesion with gross margins, down to the subcutaneous level. Scalpel and curved scissors were used to complete the excision. The specimen is suture marked for pathologic orientation and sent for frozen section. Hemostasis was achieved with bipolar cautery and a temporary pressure dressing was applied. Frozen section revealed negative margins. The excision/defect is  2.1 cm    We closed the defect primarily with a layered closure. The surrounding skin was undermined with scissors, then the wound closed in layers with interrupted 4-0 vicryl deep suture, and a running 5-0 fast absorbing plain gut for the skin. The wound was cleaned and Steri-strips and sterile pressure dressing was applied.

## 2021-01-12 ENCOUNTER — OFFICE VISIT (OUTPATIENT)
Dept: ENT CLINIC | Age: 86
End: 2021-01-12
Payer: MEDICARE

## 2021-01-12 VITALS
OXYGEN SATURATION: 97 % | SYSTOLIC BLOOD PRESSURE: 130 MMHG | HEIGHT: 65 IN | BODY MASS INDEX: 20.66 KG/M2 | DIASTOLIC BLOOD PRESSURE: 78 MMHG | HEART RATE: 67 BPM | TEMPERATURE: 96.4 F | RESPIRATION RATE: 17 BRPM | WEIGHT: 124 LBS

## 2021-01-12 DIAGNOSIS — T81.89XA SUTURE GRANULOMA, INITIAL ENCOUNTER: ICD-10-CM

## 2021-01-12 DIAGNOSIS — C44.41 BASAL CELL CARCINOMA (BCC) OF SKIN OF NECK: Primary | ICD-10-CM

## 2021-01-12 PROCEDURE — 1090F PRES/ABSN URINE INCON ASSESS: CPT | Performed by: OTOLARYNGOLOGY

## 2021-01-12 PROCEDURE — G8427 DOCREV CUR MEDS BY ELIG CLIN: HCPCS | Performed by: OTOLARYNGOLOGY

## 2021-01-12 PROCEDURE — 1101F PT FALLS ASSESS-DOCD LE1/YR: CPT | Performed by: OTOLARYNGOLOGY

## 2021-01-12 PROCEDURE — G8510 SCR DEP NEG, NO PLAN REQD: HCPCS | Performed by: OTOLARYNGOLOGY

## 2021-01-12 PROCEDURE — G8420 CALC BMI NORM PARAMETERS: HCPCS | Performed by: OTOLARYNGOLOGY

## 2021-01-12 PROCEDURE — 99213 OFFICE O/P EST LOW 20 MIN: CPT | Performed by: OTOLARYNGOLOGY

## 2021-01-12 PROCEDURE — G8536 NO DOC ELDER MAL SCRN: HCPCS | Performed by: OTOLARYNGOLOGY

## 2021-01-12 NOTE — PROGRESS NOTES
Subjective:    Lena Terrazas   80 y.o.   12/8/1930     Follow-up  Pertinent negatives include no chest pain, no headaches and no shortness of breath. Location - right neck skin    Quality - skin CA    Severity -  moderate    Duration - few months    Timing - ongoing    Context - pt with hx BCC right posterior neck skin, had an excision before, but nodule recurred and subsequent bx showing recurrence of BCC, now sent for surgical excision; pt reports itching still at site    Modifying Features - none    Associated symptoms/signs - prior hx skin CA    1/12/21 -she is 6 weeks postop, states a somewhat tender reddish area at the incision site for the last couple of weeks she is using Neosporin seems to be getting better      Review of Systems  Review of Systems   Constitutional: Negative for chills and fever. HENT: Negative for ear pain, hearing loss, nosebleeds and tinnitus. Eyes: Negative for blurred vision and double vision. Respiratory: Negative for cough, sputum production and shortness of breath. Cardiovascular: Negative for chest pain and palpitations. Gastrointestinal: Negative for heartburn, nausea and vomiting. Musculoskeletal: Positive for joint pain and myalgias. Negative for neck pain. Skin: Positive for itching. Neurological: Negative for dizziness, speech change, weakness and headaches. Endo/Heme/Allergies: Negative for environmental allergies. Does not bruise/bleed easily. Psychiatric/Behavioral: Negative for memory loss. The patient does not have insomnia.           Past Medical History:   Diagnosis Date    DDD (degenerative disc disease)     Duodenal ulcer 1976    FH: leukemia     Headache(784.0)     Hypercholesterolemia     Osteopenia     Osteoporosis     Rectal polyp     Subarachnoid hemorrhage ([facility] [address] 1983     Past Surgical History:   Procedure Laterality Date    HX CYSTOCELE REPAIR      HX HYSTERECTOMY      2/2 fibroids    HX MOHS PROCEDURES      HX ORTHOPAEDIC      left foot surgery    HX UROLOGICAL      bladder surgery    NE REPAIR OF RECTOCELE        Family History   Problem Relation Age of Onset    Cancer Sister         leukemia    Cancer Brother         leukemia     Social History     Tobacco Use    Smoking status: Former Smoker     Packs/day: 0.50     Years: 20.00     Pack years: 10.00     Quit date: 7/15/1976     Years since quittin.5    Smokeless tobacco: Never Used   Substance Use Topics    Alcohol use: No      Prior to Admission medications    Medication Sig Start Date End Date Taking? Authorizing Provider   difluprednate (DurezoL) 0.05 % ophthalmic emulsion Administer 1 Drop to left eye two (2) times a day. Provider, Historical   cholecalciferol, vitamin D3, (Vitamin D3) 50 mcg (2,000 unit) tab Take 1 Tab by mouth daily. Provider, Historical   timolol (TIMOPTIC) 0.5 % ophthalmic solution Administer 1 Drop to right eye daily. 6/8/10   Provider, Historical        No Known Allergies      Objective:     Visit Vitals  /78 (BP 1 Location: Left arm, BP Patient Position: Sitting)   Pulse 67   Temp (!) 96.4 °F (35.8 °C) (Oral)   Resp 17   Ht 5' 5\" (1.651 m)   Wt 124 lb (56.2 kg)   SpO2 97%   BMI 20.63 kg/m²        Physical Exam  Vitals signs reviewed. Constitutional:       General: She is awake. She is not in acute distress. Appearance: Normal appearance. She is well-groomed and normal weight. HENT:      Head: Normocephalic and atraumatic. Jaw: There is normal jaw occlusion. No trismus, tenderness or malocclusion. Salivary Glands: Right salivary gland is not diffusely enlarged or tender. Left salivary gland is not diffusely enlarged or tender. Right Ear: Hearing, tympanic membrane, ear canal and external ear normal.      Left Ear: Hearing, tympanic membrane, ear canal and external ear normal.      Ears:      Cooper exam findings: does not lateralize. Right Rinne: AC > BC. Left Rinne: AC > BC.      Nose: No nasal deformity, septal deviation or mucosal edema. Right Nostril: No epistaxis. Left Nostril: No epistaxis. Right Turbinates: Not enlarged, swollen or pale. Left Turbinates: Not enlarged, swollen or pale. Right Sinus: No maxillary sinus tenderness or frontal sinus tenderness. Left Sinus: No maxillary sinus tenderness or frontal sinus tenderness. Mouth/Throat:      Lips: Pink. No lesions. Mouth: Mucous membranes are moist. No oral lesions. Dentition: Normal dentition. No dental caries. Tongue: No lesions. Palate: No mass and lesions. Pharynx: Oropharynx is clear. Uvula midline. No oropharyngeal exudate or posterior oropharyngeal erythema. Tonsils: No tonsillar exudate. 0 on the right. 0 on the left. Eyes:      General: Vision grossly intact. Extraocular Movements: Extraocular movements intact. Right eye: No nystagmus. Left eye: No nystagmus. Conjunctiva/sclera: Conjunctivae normal.      Pupils: Pupils are equal, round, and reactive to light. Neck:      Musculoskeletal: No edema or erythema. Thyroid: No thyroid mass, thyromegaly or thyroid tenderness. Trachea: Trachea and phonation normal. No tracheal tenderness or tracheal deviation. Comments: Raised, reddish area consistent with a suture granuloma; otherwise well-healing incision  Cardiovascular:      Rate and Rhythm: Normal rate and regular rhythm. Pulmonary:      Effort: Pulmonary effort is normal. No respiratory distress. Breath sounds: No stridor. Musculoskeletal: Normal range of motion. General: No swelling or tenderness. Lymphadenopathy:      Cervical: No cervical adenopathy. Skin:     General: Skin is warm and dry. Findings: Lesion present. No rash. Neurological:      General: No focal deficit present. Mental Status: She is alert and oriented to person, place, and time. Mental status is at baseline.       Cranial Nerves: Cranial nerves are intact. Coordination: Romberg sign negative. Gait: Gait is intact. Comments: Negative Hallpike   Psychiatric:         Mood and Affect: Mood normal.         Behavior: Behavior normal. Behavior is cooperative. Assessment/Plan:     Encounter Diagnoses   Name Primary?  Basal cell carcinoma (BCC) of skin of neck Yes    Suture granuloma, initial encounter      The suture granuloma is debrided any foreign material is removed. Antibiotic ointment is applied along with a Band-Aid. Continue Neosporin at home, wound care discussed, follow-up in 3 weeks. No orders of the defined types were placed in this encounter. Follow-up and Dispositions    · Return in about 3 weeks (around 2/2/2021).

## 2021-01-13 ENCOUNTER — TELEPHONE (OUTPATIENT)
Dept: ENT CLINIC | Age: 86
End: 2021-01-13

## 2021-01-18 ENCOUNTER — TELEPHONE (OUTPATIENT)
Dept: ENT CLINIC | Age: 86
End: 2021-01-18

## 2021-02-02 ENCOUNTER — OFFICE VISIT (OUTPATIENT)
Dept: ENT CLINIC | Age: 86
End: 2021-02-02
Payer: MEDICARE

## 2021-02-02 VITALS
HEIGHT: 65 IN | OXYGEN SATURATION: 99 % | DIASTOLIC BLOOD PRESSURE: 80 MMHG | WEIGHT: 130.8 LBS | BODY MASS INDEX: 21.79 KG/M2 | SYSTOLIC BLOOD PRESSURE: 126 MMHG | HEART RATE: 85 BPM

## 2021-02-02 DIAGNOSIS — C44.41 BASAL CELL CARCINOMA (BCC) OF SKIN OF NECK: Primary | ICD-10-CM

## 2021-02-02 PROCEDURE — 12041 INTMD RPR N-HF/GENIT 2.5CM/<: CPT | Performed by: OTOLARYNGOLOGY

## 2021-02-02 PROCEDURE — 88305 TISSUE EXAM BY PATHOLOGIST: CPT

## 2021-02-02 PROCEDURE — 11622 EXC S/N/H/F/G MAL+MRG 1.1-2: CPT | Performed by: OTOLARYNGOLOGY

## 2021-02-02 NOTE — PROGRESS NOTES
Subjective:    Rg Fitch   80 y.o.   1930     Follow-up visit    Location - right neck skin    Quality - skin CA    Severity -  moderate    Duration - few months    Timing - ongoing    Context - pt with hx BCC right posterior neck skin, had an excision before, but nodule recurred and subsequent bx showing recurrence of BCC, now sent for surgical excision; pt reports itching still at site    Modifying Features - none    Associated symptoms/signs - prior hx skin CA    21 -she is 6 weeks postop, states a somewhat tender reddish area at the incision site for the last couple of weeks she is using Neosporin seems to be getting better    2021 -3-week follow-up she still has some tender nodularity in the area of the suture granuloma          Past Medical History:   Diagnosis Date    DDD (degenerative disc disease)     Duodenal ulcer     FH: leukemia     Headache(784.0)     Hypercholesterolemia     Osteopenia     Osteoporosis     Rectal polyp     Subarachnoid hemorrhage ([facility] [address]      Past Surgical History:   Procedure Laterality Date    HX CYSTOCELE REPAIR      HX HYSTERECTOMY      2/2 fibroids    HX MOHS PROCEDURES      HX ORTHOPAEDIC      left foot surgery    HX UROLOGICAL      bladder surgery    NE REPAIR OF RECTOCELE        Family History   Problem Relation Age of Onset    Cancer Sister         leukemia    Cancer Brother         leukemia     Social History     Tobacco Use    Smoking status: Former Smoker     Packs/day: 0.50     Years: 20.00     Pack years: 10.00     Quit date: 7/15/1976     Years since quittin.5    Smokeless tobacco: Never Used   Substance Use Topics    Alcohol use: No      Prior to Admission medications    Medication Sig Start Date End Date Taking? Authorizing Provider   difluprednate (DurezoL) 0.05 % ophthalmic emulsion Administer 1 Drop to left eye two (2) times a day.    Yes Provider, Historical   cholecalciferol, vitamin D3, (Vitamin D3) 50 mcg (2,000 unit) tab Take 1 Tab by mouth daily. Yes Provider, Historical   timolol (TIMOPTIC) 0.5 % ophthalmic solution Administer 1 Drop to right eye daily. 6/8/10  Yes Provider, Historical        No Known Allergies      Objective:     Visit Vitals  /80   Pulse 85   Ht 5' 5\" (1.651 m)   Wt 130 lb 12.8 oz (59.3 kg)   SpO2 99%   BMI 21.77 kg/m²        There remains some nodularity at the superior aspect of her prior excision. I recommended doing a wide excisional biopsy of this area in case of recurrence, she agrees    Excision Malignant Neoplasm of Skin with Intermediate Closure    Informed consent was obtained. The area surrounding the skin lesion was cleansed with alcohol then injected with 3 mL of 1% lidocaine with 1:100,000 parts epinephrine. We then prepped the area with betadine and draped in sterile fashion. A 15 blade was used to incise the skin surrounding the lesion with gross margins, down to the subcutaneous level. Scalpel and curved scissors were used to complete the excision. The specimen is suture marked for pathologic orientation. The excision/defect is 1.5 cm    We closed the defect primarily with a layered closure. The surrounding skin was undermined with scissors, then the wound closed in layers with interrupted 4-0 vicryl deep suture, and a running 5-0 fast absorbing plain gut for the skin. The wound was cleaned and Steri-strips and sterile pressure dressing was applied. Assessment/Plan:     Encounter Diagnoses   Name Primary?  Basal cell carcinoma (BCC) of skin of neck Yes     Persistent nodularity at the site of original excision, concerning for recurrence or persistence of BCC. I have excised this area again widely I will call her with the results.       Orders Placed This Encounter    SURGICAL PATHOLOGY

## 2021-02-03 ENCOUNTER — HOSPITAL ENCOUNTER (OUTPATIENT)
Dept: LAB | Age: 86
Discharge: HOME | End: 2021-02-03
Payer: MEDICARE

## 2021-02-04 NOTE — PROGRESS NOTES
Please notify patient there was a small amount of cancer still in the area we removed but it has now been completely removed, no further treatment required. Can return as needed.

## 2021-02-23 ENCOUNTER — OFFICE VISIT (OUTPATIENT)
Dept: FAMILY MEDICINE CLINIC | Age: 86
End: 2021-02-23
Payer: MEDICARE

## 2021-02-23 VITALS
DIASTOLIC BLOOD PRESSURE: 110 MMHG | BODY MASS INDEX: 20.99 KG/M2 | WEIGHT: 126 LBS | OXYGEN SATURATION: 99 % | HEIGHT: 65 IN | RESPIRATION RATE: 16 BRPM | HEART RATE: 75 BPM | SYSTOLIC BLOOD PRESSURE: 191 MMHG | TEMPERATURE: 97.2 F

## 2021-02-23 DIAGNOSIS — R03.0 WHITE COAT SYNDROME WITHOUT DIAGNOSIS OF HYPERTENSION: ICD-10-CM

## 2021-02-23 DIAGNOSIS — Z01.818 PRE-OP EXAM: ICD-10-CM

## 2021-02-23 DIAGNOSIS — H02.403 PTOSIS OF BOTH UPPER EYELIDS: Primary | ICD-10-CM

## 2021-02-23 PROCEDURE — G8536 NO DOC ELDER MAL SCRN: HCPCS | Performed by: FAMILY MEDICINE

## 2021-02-23 PROCEDURE — 1101F PT FALLS ASSESS-DOCD LE1/YR: CPT | Performed by: FAMILY MEDICINE

## 2021-02-23 PROCEDURE — 99213 OFFICE O/P EST LOW 20 MIN: CPT | Performed by: FAMILY MEDICINE

## 2021-02-23 PROCEDURE — 93000 ELECTROCARDIOGRAM COMPLETE: CPT | Performed by: FAMILY MEDICINE

## 2021-02-23 PROCEDURE — G8510 SCR DEP NEG, NO PLAN REQD: HCPCS | Performed by: FAMILY MEDICINE

## 2021-02-23 PROCEDURE — G8427 DOCREV CUR MEDS BY ELIG CLIN: HCPCS | Performed by: FAMILY MEDICINE

## 2021-02-23 PROCEDURE — G8420 CALC BMI NORM PARAMETERS: HCPCS | Performed by: FAMILY MEDICINE

## 2021-02-23 PROCEDURE — 1090F PRES/ABSN URINE INCON ASSESS: CPT | Performed by: FAMILY MEDICINE

## 2021-02-23 NOTE — PROGRESS NOTES
Christi Rodriges is a 80 y.o. female who present for pre-operative evaluation. Christi Rodriges was referred for evaluation by:Dr. Lolis Hunter for Pre- Op Evaluation. Surgeon:  Mena Evangelista  Surgery:  PINA Levaotr Repair, and RUL Blepharopasty  Anesthesia type: LMA-general  Indication:  Ptosis  Date of Surgery: 3/4/21    Signs and symptoms:  Ptosis of eyelids bilaterally  Duration:  Last year  Context:  Blocks sight  Location:  Bilateral eyelids  Quality:  Worsening  Severity:  Moderate  Timing:  Constant  Modifying factors:  None  Allergies: No Known Allergies  Latex allergy: no    Surgical risk:  Low  Low:  Cataract, breast, dental, endoscopy  Intermediate:  Orthopedic, abdominal, thoracic, carotid endarterctomy, prostate  High:  Vascular, aortic, emergent, high risk of blood loss    Patient risks:    Age:  80 y.o. Abnormal EKG:  No  Obesity:  no, Body mass index is 20.97 kg/m². Hypertension: Reports at home BP is in the -120's and DBP 60-80's    CAD:  no  MI < 6 weeks:  no  Chest pain or exertional dyspnea:  no  Heart rhythm problems:  no  Syncope:  no  Heart valve problems:  no  CHF:  no    Diagnosed diabetes:  no  H/o CVA:  no  kidney disease:  no  Screening for ETOH use:  Done and low risk  Smoking status:  Nonsmoker    Functional assessment:   can walk 2 blocks and up a flight of steps carrying groceries  Low functional capacity (< 4 METS):  no    Personal or FH of bleeding problems:  no  Personal or FH of blood clots:  no  Personal or FH of anesthesia problems:  no    Pulmonary Risk:  Asthma or COPD:  no  Obesity:  Body mass index is 20.97 kg/m².   Surgery close to diaphragm:  no  Known JESUS:  no  Albumin normal, BUN normal  Must quit smoking > 8 weeks pre-op        Past Medical History:   Diagnosis Date    DDD (degenerative disc disease)     Duodenal ulcer 1976    FH: leukemia     Headache(784.0)     Hypercholesterolemia     Osteopenia     Osteoporosis     Rectal polyp     Subarachnoid hemorrhage ([facility] [address]      Past Surgical History:   Procedure Laterality Date    HX CYSTOCELE REPAIR      HX HYSTERECTOMY      2/2 fibroids    HX MOHS PROCEDURES      HX ORTHOPAEDIC      left foot surgery    HX OTHER SURGICAL  07/15/2020    Glaucoma surgery    HX UROLOGICAL      bladder surgery    LA REPAIR OF RECTOCELE         Medications:  Current Outpatient Medications   Medication Sig Dispense Refill    cholecalciferol, vitamin D3, (Vitamin D3) 50 mcg (2,000 unit) tab Take 1 Tab by mouth daily.  timolol (TIMOPTIC) 0.5 % ophthalmic solution Administer 1 Drop to right eye daily. Allergies:  No Known Allergies    LMP:  No LMP recorded. Patient has had a hysterectomy.     Social History     Socioeconomic History    Marital status:      Spouse name: Not on file    Number of children: Not on file    Years of education: Not on file    Highest education level: Not on file   Occupational History    Not on file   Social Needs    Financial resource strain: Not on file    Food insecurity     Worry: Not on file     Inability: Not on file    Transportation needs     Medical: Not on file     Non-medical: Not on file   Tobacco Use    Smoking status: Former Smoker     Packs/day: 0.50     Years: 20.00     Pack years: 10.00     Quit date: 7/15/1976     Years since quittin.6    Smokeless tobacco: Never Used   Substance and Sexual Activity    Alcohol use: No    Drug use: Not on file    Sexual activity: Not on file   Lifestyle    Physical activity     Days per week: Not on file     Minutes per session: Not on file    Stress: Not on file   Relationships    Social connections     Talks on phone: Not on file     Gets together: Not on file     Attends [employer] service: Not on file     Active member of club or organization: Not on file     Attends meetings of clubs or organizations: Not on file     Relationship status: Not on file    Intimate partner violence     Fear of current or ex partner: Not on file     Emotionally abused: Not on file     Physically abused: Not on file     Forced sexual activity: Not on file   Other Topics Concern    Not on file   Social History Narrative    Not on file       Family History   Problem Relation Age of Onset    Cancer Sister         leukemia    Cancer Brother         leukemia         Consider EST if   -any cardiac symptoms  -PTCA < 6 months OR > 5 years ago    (NO EST if normal EST < 2 years, CABG and NO SX , 5 years, PTCA and NO SX 6 mo to 5 years)    Coumadin   high risk= mechanical heart valve, VTE with hypercoag state, VTE < 3 months  Low risk= AF w/o CVA, h/o DVT > 3 months and NO hypercoag state  high risk:  Bridge with Lovenox  Low risk:  Stop 5 days prior to surgery    Revised Cardiac Risk Index Score: one point for each  High-risk surgery  CAD  CVD  Renal insufficiency  DM    If RCRS > 2 provide beta blockade    ROS:  Headaches:  None  Chest Pain:  None  SOB:  None  Fevers:  None  Other significant ROS:  None      Physical Exam  Visit Vitals  BP (!) 191/110   Pulse 75   Temp 97.2 °F (36.2 °C) (Temporal)   Resp 16   Ht 5' 5\" (1.651 m)   Wt 126 lb (57.2 kg)   SpO2 99%   BMI 20.97 kg/m²     Physical Exam  Vitals signs and nursing note reviewed. Constitutional:       Appearance: Normal appearance. HENT:      Head: Normocephalic and atraumatic. Eyes:      Comments: Bilaterally Ptosis   Neck:      Musculoskeletal: Normal range of motion and neck supple. Cardiovascular:      Rate and Rhythm: Normal rate and regular rhythm. Pulses: Normal pulses. Heart sounds: Normal heart sounds. No murmur. No friction rub. No gallop. Pulmonary:      Effort: Pulmonary effort is normal.      Breath sounds: Normal breath sounds. Abdominal:      General: Abdomen is flat. Bowel sounds are normal.      Palpations: Abdomen is soft. Musculoskeletal: Normal range of motion. Skin:     General: Skin is warm and dry.    Neurological:      Mental Status: She is alert. Lab Results:  No results found for this or any previous visit (from the past 24 hour(s)). EKG Results     Procedure 720 Value Units Date/Time    AMB POC EKG ROUTINE W/ 12 LEADS, INTER & REP [[id number]] Collected: 02/23/21 1003    Order Status: Completed Updated: 02/23/21 1003            EKG:    Indication:  Male > 39, female > 50, one cardiac risk factor    EKG interpretation: 02/23/21    Rhythm: normal sinus rhythm; and regular . Rate (approx.): 70; Axis: normal; P wave: normal; QRS interval: normal ; ST/T wave: normal; Other findings: normal. Comparison to previous EKG: No significant change from previous. Assessment and Plan:    ICD-10-CM ICD-9-CM    1. Ptosis of both upper eyelids  O02.301 681.34 METABOLIC PANEL, COMPREHENSIVE      HGB & HCT      METABOLIC PANEL, COMPREHENSIVE      HGB & HCT   2. Pre-op exam  Z01.818 V72.84 AMB POC EKG ROUTINE W/ 12 LEADS, INTER & REP      METABOLIC PANEL, COMPREHENSIVE      HGB & HCT      METABOLIC PANEL, COMPREHENSIVE      HGB & HCT   3. White coat syndrome without diagnosis of hypertension  R03.0 796.2        Assessment:  Patient is low risk for surgery, medically stable for surgery    Diagnoses and all orders for this visit:    1. Ptosis of both upper eyelids  -     METABOLIC PANEL, COMPREHENSIVE; Future  -     HGB & HCT; Future    2. Pre-op exam  -     AMB POC EKG ROUTINE W/ 12 LEADS, INTER & REP  -     METABOLIC PANEL, COMPREHENSIVE; Future  -     HGB & HCT; Future    3.  White coat syndrome without diagnosis of hypertension            MD CYNDEE [facility] [place] [facility]  02/23/21

## 2021-02-23 NOTE — PROGRESS NOTES
1. Have you been to the ER, urgent care clinic, or been hospitalized since your last visit? No     2. Have you seen or consulted any other health care providers outside of the [address] [place] [address] since your last visit? No     Reviewed record in preparation for visit and have necessary documentation  Goals that were addressed and/or need to be completed during or after this appointment include   Health Maintenance Due   Topic Date Due    COVID-19 Vaccine (1 of 2) 12/08/1946    Shingrix Vaccine Age 50> (1 of 2) 12/08/1980    Bone Densitometry (Dexa) Screening  12/08/1995    Pneumococcal 65+ years (1 of 1 - PPSV23) 12/08/1995    Medicare Yearly Exam  03/14/2018    GLAUCOMA SCREENING Q2Y  05/16/2020    Flu Vaccine (1) 09/01/2020       I have received verbal consent from Cooper Ray to discuss any/all medical information while others present in the room.

## 2021-02-24 LAB
ALBUMIN SERPL-MCNC: 3.8 G/DL (ref 3.5–5)
ALBUMIN/GLOB SERPL: 1 {RATIO} (ref 1.1–2.2)
ALP SERPL-CCNC: 115 U/L (ref 45–117)
ALT SERPL-CCNC: 17 U/L (ref 12–78)
ANION GAP SERPL CALC-SCNC: 6 MMOL/L (ref 5–15)
AST SERPL-CCNC: 16 U/L (ref 15–37)
BILIRUB SERPL-MCNC: 0.5 MG/DL (ref 0.2–1)
BUN SERPL-MCNC: 20 MG/DL (ref 6–20)
BUN/CREAT SERPL: 28 (ref 12–20)
CALCIUM SERPL-MCNC: 9.8 MG/DL (ref 8.5–10.1)
CHLORIDE SERPL-SCNC: 98 MMOL/L (ref 97–108)
CO2 SERPL-SCNC: 32 MMOL/L (ref 21–32)
CREAT SERPL-MCNC: 0.71 MG/DL (ref 0.55–1.02)
GLOBULIN SER CALC-MCNC: 4 G/DL (ref 2–4)
GLUCOSE SERPL-MCNC: 72 MG/DL (ref 65–100)
HCT VFR BLD AUTO: 46.6 % (ref 35–47)
HGB BLD-MCNC: 14.5 G/DL (ref 11.5–16)
POTASSIUM SERPL-SCNC: 4.9 MMOL/L (ref 3.5–5.1)
PROT SERPL-MCNC: 7.8 G/DL (ref 6.4–8.2)
SODIUM SERPL-SCNC: 136 MMOL/L (ref 136–145)

## 2021-03-02 ENCOUNTER — TELEPHONE (OUTPATIENT)
Dept: FAMILY MEDICINE CLINIC | Age: 86
End: 2021-03-02

## 2021-03-02 NOTE — TELEPHONE ENCOUNTER
Pt states that she came in on 2/23 for a pre-op appt. And the surgeon office called her this AM and stated that Dr. Silvano Sawant has not sent over the report from visit. Pt states that surgery is Thursday.  Please call pt back

## 2022-03-18 PROBLEM — T81.89XA SUTURE GRANULOMA: Status: ACTIVE | Noted: 2021-01-12

## 2022-03-18 PROBLEM — C44.41 BASAL CELL CARCINOMA (BCC) OF SKIN OF NECK: Status: ACTIVE | Noted: 2021-01-12

## 2022-03-18 PROBLEM — I16.0 HYPERTENSIVE URGENCY: Status: ACTIVE | Noted: 2020-09-01

## 2022-03-19 PROBLEM — H40.1132 PRIMARY OPEN ANGLE GLAUCOMA OF BOTH EYES, MODERATE STAGE: Status: ACTIVE | Noted: 2017-09-11

## 2022-03-19 PROBLEM — R09.89 SUSPECTED CEREBROVASCULAR ACCIDENT: Status: ACTIVE | Noted: 2020-09-01

## 2022-03-19 PROBLEM — R09.89 SUSPECTED CEREBROVASCULAR ACCIDENT (CVA): Status: ACTIVE | Noted: 2020-09-02

## 2022-03-19 PROBLEM — Z96.1 BILATERAL PSEUDOPHAKIA: Status: ACTIVE | Noted: 2017-09-11

## 2022-03-20 PROBLEM — E04.1 THYROID NODULE: Status: ACTIVE | Noted: 2020-09-02

## 2022-07-14 ENCOUNTER — OFFICE VISIT (OUTPATIENT)
Dept: FAMILY MEDICINE CLINIC | Age: 87
End: 2022-07-14
Payer: MEDICARE

## 2022-07-14 VITALS
HEART RATE: 104 BPM | RESPIRATION RATE: 20 BRPM | BODY MASS INDEX: 20.83 KG/M2 | TEMPERATURE: 97.6 F | DIASTOLIC BLOOD PRESSURE: 87 MMHG | SYSTOLIC BLOOD PRESSURE: 127 MMHG | HEIGHT: 65 IN | OXYGEN SATURATION: 95 % | WEIGHT: 125 LBS

## 2022-07-14 DIAGNOSIS — E78.00 HYPERCHOLESTEROLEMIA: Primary | ICD-10-CM

## 2022-07-14 DIAGNOSIS — R03.0 ELEVATED BLOOD PRESSURE READING IN OFFICE WITH WHITE COAT SYNDROME, WITHOUT DIAGNOSIS OF HYPERTENSION: ICD-10-CM

## 2022-07-14 DIAGNOSIS — K40.90 RIGHT INGUINAL HERNIA: ICD-10-CM

## 2022-07-14 DIAGNOSIS — E55.9 VITAMIN D DEFICIENCY: ICD-10-CM

## 2022-07-14 PROCEDURE — G8536 NO DOC ELDER MAL SCRN: HCPCS | Performed by: FAMILY MEDICINE

## 2022-07-14 PROCEDURE — G8427 DOCREV CUR MEDS BY ELIG CLIN: HCPCS | Performed by: FAMILY MEDICINE

## 2022-07-14 PROCEDURE — G8420 CALC BMI NORM PARAMETERS: HCPCS | Performed by: FAMILY MEDICINE

## 2022-07-14 PROCEDURE — G8510 SCR DEP NEG, NO PLAN REQD: HCPCS | Performed by: FAMILY MEDICINE

## 2022-07-14 PROCEDURE — 99214 OFFICE O/P EST MOD 30 MIN: CPT | Performed by: FAMILY MEDICINE

## 2022-07-14 PROCEDURE — 1090F PRES/ABSN URINE INCON ASSESS: CPT | Performed by: FAMILY MEDICINE

## 2022-07-14 PROCEDURE — 1123F ACP DISCUSS/DSCN MKR DOCD: CPT | Performed by: FAMILY MEDICINE

## 2022-07-14 PROCEDURE — 1101F PT FALLS ASSESS-DOCD LE1/YR: CPT | Performed by: FAMILY MEDICINE

## 2022-07-14 NOTE — PROGRESS NOTES
CC: Elevated BP    HPI: Pt is a 80 y.o. female who presents for elevated BP. She does not have a diagnosis of HTN but reports that she was seen in the ER for elevated BP after her eye surgery several months ago. Of note, she had severely elevated BP at her pre-op visit. She denies changes in vision, increased swelling (has RLE at baseline), CP and SOB. BP's at home have ranged 'Z systolic (pt has logs today). She is also concerned she may have a hernia. She noticed a bulge in her R inguinal area that is constant for the past 6 months or so. It is not painful and does not get red, but she wanted to have it checked.        Past Medical History:   Diagnosis Date    DDD (degenerative disc disease)     Duodenal ulcer     FH: leukemia     Headache(784.0)     Hypercholesterolemia     Osteopenia     Osteoporosis     Rectal polyp     Subarachnoid hemorrhage ([address]        Family History   Problem Relation Age of Onset    Cancer Sister         leukemia    Cancer Brother         leukemia       Social History     Tobacco Use    Smoking status: Former Smoker     Packs/day: 0.50     Years: 20.00     Pack years: 10.00     Quit date: 7/15/1976     Years since quittin.0    Smokeless tobacco: Never Used   Vaping Use    Vaping Use: Never used   Substance Use Topics    Alcohol use: No    Drug use: Not on file       ROS:  Per HPI    PE:  Visit Vitals  /87   Pulse (!) 104   Temp 97.6 °F (36.4 °C) (Oral)   Resp 20   Ht 5' 5\" (1.651 m)   Wt 125 lb (56.7 kg)   SpO2 95%   BMI 20.80 kg/m²     Gen: Pt sitting in chair, in NAD  Head: Normocephalic, atraumatic  Eyes: Sclera anicteric, EOM grossly intact, PERRL  Nose: Normal nasal mucosa  Throat: MMM, normal lips, tongue and gums  Neck: Supple, no LAD, no thyromegaly or carotid bruits  CVS: Normal S1, S2, no m/r/g  Resp: CTAB, no wheezes or rales  Extrem: Atraumatic, no cyanosis or edema  Pulses: 2+   Skin: Warm, dry  Neuro: Alert, oriented, appropriate      A/P:   Encounter Diagnoses     ICD-10-CM ICD-9-CM   1. Hypercholesterolemia  E78.00 272.0   2. Elevated blood pressure reading in office with white coat syndrome, without diagnosis of hypertension  R03.0 796.2   3. Right inguinal hernia  K40.90 550.90   4. Vitamin D deficiency  E55.9 268.9     1. Hypercholesterolemia: Pt would like cholesterol checked today, has not been checked in a few years  - LIPID PANEL; Future    2. Elevated blood pressure reading in office with white coat syndrome, without diagnosis of hypertension: BP initially elevated after walking down [address] but improved on recheck. Given some home values of systolics in 44'P, do not want to add medication at this time. Will have pt continue to monitor and call if going 140/90 or greater.  - METABOLIC PANEL, COMPREHENSIVE; Future    3. Right inguinal hernia:  - REFERRAL TO SURGERY    4. Vitamin D deficiency: Stable on current medications       RTC in 2 weeks for MWV, or sooner prn    A total of 30 minutes was spent on this encounter including 20 minutes obtaining the history and physical exam and 10 minutes documenting. Discussed diagnoses in detail with patient. Medication risks/benefits/side effects discussed with patient. All of the patient's questions were addressed. The patient understands and agrees with our plan of care. The patient knows to call back if they are unsure of or forget any changes we discussed today or if the symptoms change. The patient received an After-Visit Summary which contains VS, orders, medication list and allergy list. This can be used as a \"mini-medical record\" should they have to seek medical care while out of town. Current Outpatient Medications on File Prior to Visit   Medication Sig Dispense Refill    cholecalciferol, vitamin D3, (Vitamin D3) 50 mcg (2,000 unit) tab Take 1 Tab by mouth daily.  timolol (TIMOPTIC) 0.5 % ophthalmic solution Administer 1 Drop to right eye daily. No current facility-administered medications on file prior to visit.

## 2022-07-14 NOTE — PATIENT INSTRUCTIONS
DASH Diet: Care Instructions  Your Care Instructions     The DASH diet is an eating plan that can help lower your blood pressure. DASH stands for Dietary Approaches to Stop Hypertension. Hypertension is high blood pressure. The DASH diet focuses on eating foods that are high in calcium, potassium, and magnesium. These nutrients can lower blood pressure. The foods that are highest in these nutrients are fruits, vegetables, low-fat dairy products, nuts, seeds, and legumes. But taking calcium, potassium, and magnesium supplements instead of eating foods that are high in those nutrients does not have the same effect. The DASH diet also includes whole grains, fish, and poultry. The DASH diet is one of several lifestyle changes your doctor may recommend to lower your high blood pressure. Your doctor may also want you to decrease the amount of sodium in your diet. Lowering sodium while following the DASH diet can lower blood pressure even further than just the DASH diet alone. Follow-up care is a key part of your treatment and safety. Be sure to make and go to all appointments, and call your doctor if you are having problems. It's also a good idea to know your test results and keep a list of the medicines you take. How can you care for yourself at home? Following the DASH diet  · Eat 4 to 5 servings of fruit each day. A serving is 1 medium-sized piece of fruit, ½ cup chopped or canned fruit, 1/4 cup dried fruit, or 4 ounces (½ cup) of fruit juice. Choose fruit more often than fruit juice. · Eat 4 to 5 servings of vegetables each day. A serving is 1 cup of lettuce or raw leafy vegetables, ½ cup of chopped or cooked vegetables, or 4 ounces (½ cup) of vegetable juice. Choose vegetables more often than vegetable juice. · Get 2 to 3 servings of low-fat and fat-free dairy each day. A serving is 8 ounces of milk, 1 cup of yogurt, or 1 ½ ounces of cheese. · Eat 6 to 8 servings of grains each day.  A serving is 1 slice of bread, 1 ounce of dry cereal, or ½ cup of cooked rice, pasta, or cooked cereal. Try to choose whole-grain products as much as possible. · Limit lean meat, poultry, and fish to 2 servings each day. A serving is 3 ounces, about the size of a deck of cards. · Eat 4 to 5 servings of nuts, seeds, and legumes (cooked dried beans, lentils, and split peas) each week. A serving is 1/3 cup of nuts, 2 tablespoons of seeds, or ½ cup of cooked beans or peas. · Limit fats and oils to 2 to 3 servings each day. A serving is 1 teaspoon of vegetable oil or 2 tablespoons of salad dressing. · Limit sweets and added sugars to 5 servings or less a week. A serving is 1 tablespoon jelly or jam, ½ cup sorbet, or 1 cup of lemonade. · Eat less than 2,300 milligrams (mg) of sodium a day. If you limit your sodium to 1,500 mg a day, you can lower your blood pressure even more. · Be aware that all of these are the suggested number of servings for people who eat 1,800 to 2,000 calories a day. Your recommended number of servings may be different if you need more or fewer calories. Tips for success  · Start small. Do not try to make dramatic changes to your diet all at once. You might feel that you are missing out on your favorite foods and then be more likely to not follow the plan. Make small changes, and stick with them. Once those changes become habit, add a few more changes. · Try some of the following:  ? Make it a goal to eat a fruit or vegetable at every meal and at snacks. This will make it easy to get the recommended amount of fruits and vegetables each day. ? Try yogurt topped with fruit and nuts for a snack or healthy dessert. ? Add lettuce, tomato, cucumber, and onion to sandwiches. ? Combine a ready-made pizza crust with low-fat mozzarella cheese and lots of vegetable toppings. Try using tomatoes, squash, spinach, broccoli, carrots, cauliflower, and onions. ?  Have a variety of cut-up vegetables with a low-fat dip as an appetizer instead of chips and dip. ? Sprinkle sunflower seeds or chopped almonds over salads. Or try adding chopped walnuts or almonds to cooked vegetables. ? Try some vegetarian meals using beans and peas. Add garbanzo or kidney beans to salads. Make burritos and tacos with mashed ibrahim beans or black beans. Where can you learn more? Go to http://carlos-raheel.info/  Enter H967 in the search box to learn more about \"DASH Diet: Care Instructions. \"  Current as of: January 10, 2022               Content Version: 13.2  © 5893-3544 [employer]. Care instructions adapted under license by [employer] (which disclaims liability or warranty for this information). If you have questions about a medical condition or this instruction, always ask your healthcare professional. [address] any warranty or liability for your use of this information.

## 2022-07-14 NOTE — PROGRESS NOTES
Chief Complaint   Patient presents with    Follow Up Chronic Condition     reports decreased energy     1. \"Have you been to the ER, urgent care clinic since your last visit? Hospitalized since your last visit? \" No    2. \"Have you seen or consulted any other health care providers outside of the [address] [place] [address] since your last visit? \" No     3. For patients aged 39-70: Has the patient had a colonoscopy / FIT/ Cologuard? NA - based on age      If the patient is female:    4. For patients aged 41-77: Has the patient had a mammogram within the past 2 years? NA - based on age or sex      11. For patients aged 21-65: Has the patient had a pap smear?  NA - based on age or sex    Health Maintenance Due   Topic Date Due    COVID-19 Vaccine (1) Never done    Shingrix Vaccine Age 50> (1 of 2) Never done    Bone Densitometry (Dexa) Screening  Never done    Pneumococcal 65+ years (1 - PCV) Never done    Medicare Yearly Exam  Never done    Depression Screen  02/23/2022

## 2022-07-15 LAB
ALBUMIN SERPL-MCNC: 3.4 G/DL (ref 3.5–5)
ALBUMIN/GLOB SERPL: 0.9 {RATIO} (ref 1.1–2.2)
ALP SERPL-CCNC: 122 U/L (ref 45–117)
ALT SERPL-CCNC: 14 U/L (ref 12–78)
ANION GAP SERPL CALC-SCNC: 3 MMOL/L (ref 5–15)
AST SERPL-CCNC: 16 U/L (ref 15–37)
BILIRUB SERPL-MCNC: 0.3 MG/DL (ref 0.2–1)
BUN SERPL-MCNC: 18 MG/DL (ref 6–20)
BUN/CREAT SERPL: 21 (ref 12–20)
CALCIUM SERPL-MCNC: 9.3 MG/DL (ref 8.5–10.1)
CHLORIDE SERPL-SCNC: 100 MMOL/L (ref 97–108)
CHOLEST SERPL-MCNC: 198 MG/DL
CO2 SERPL-SCNC: 34 MMOL/L (ref 21–32)
CREAT SERPL-MCNC: 0.87 MG/DL (ref 0.55–1.02)
GLOBULIN SER CALC-MCNC: 4 G/DL (ref 2–4)
GLUCOSE SERPL-MCNC: 103 MG/DL (ref 65–100)
HDLC SERPL-MCNC: 69 MG/DL
HDLC SERPL: 2.9 {RATIO} (ref 0–5)
LDLC SERPL CALC-MCNC: 114.4 MG/DL (ref 0–100)
POTASSIUM SERPL-SCNC: 4.5 MMOL/L (ref 3.5–5.1)
PROT SERPL-MCNC: 7.4 G/DL (ref 6.4–8.2)
SODIUM SERPL-SCNC: 137 MMOL/L (ref 136–145)
TRIGL SERPL-MCNC: 73 MG/DL (ref ?–150)
VLDLC SERPL CALC-MCNC: 14.6 MG/DL

## 2022-08-01 ENCOUNTER — VIRTUAL VISIT (OUTPATIENT)
Dept: FAMILY MEDICINE CLINIC | Age: 87
End: 2022-08-01
Payer: MEDICARE

## 2022-08-01 DIAGNOSIS — Z00.00 MEDICARE ANNUAL WELLNESS VISIT, SUBSEQUENT: Primary | ICD-10-CM

## 2022-08-01 PROCEDURE — G8420 CALC BMI NORM PARAMETERS: HCPCS | Performed by: FAMILY MEDICINE

## 2022-08-01 PROCEDURE — G8427 DOCREV CUR MEDS BY ELIG CLIN: HCPCS | Performed by: FAMILY MEDICINE

## 2022-08-01 PROCEDURE — G8432 DEP SCR NOT DOC, RNG: HCPCS | Performed by: FAMILY MEDICINE

## 2022-08-01 PROCEDURE — G8536 NO DOC ELDER MAL SCRN: HCPCS | Performed by: FAMILY MEDICINE

## 2022-08-01 PROCEDURE — 1123F ACP DISCUSS/DSCN MKR DOCD: CPT | Performed by: FAMILY MEDICINE

## 2022-08-01 PROCEDURE — G0439 PPPS, SUBSEQ VISIT: HCPCS | Performed by: FAMILY MEDICINE

## 2022-08-01 PROCEDURE — 1101F PT FALLS ASSESS-DOCD LE1/YR: CPT | Performed by: FAMILY MEDICINE

## 2022-08-01 NOTE — PATIENT INSTRUCTIONS
Medicare Wellness Visit, Female     The best way to live healthy is to have a lifestyle where you eat a well-balanced diet, exercise regularly, limit alcohol use, and quit all forms of tobacco/nicotine, if applicable. Regular preventive services are another way to keep healthy. Preventive services (vaccines, screening tests, monitoring & exams) can help personalize your care plan, which helps you manage your own care. Screening tests can find health problems at the earliest stages, when they are easiest to treat. [facility] follows the current, evidence-based guidelines published by the [facility] Nilesh Odom ([facility]) when recommending preventive services for our patients. Because we follow these guidelines, sometimes recommendations change over time as research supports it. (For example, mammograms used to be recommended annually. Even though Medicare will still pay for an annual mammogram, the newer guidelines recommend a mammogram every two years for women of average risk). Of course, you and your doctor may decide to screen more often for some diseases, based on your risk and your co-morbidities (chronic disease you are already diagnosed with). Preventive services for you include:  - Medicare offers their members a free annual wellness visit, which is time for you and your primary care provider to discuss and plan for your preventive service needs. Take advantage of this benefit every year!  -All adults over the age of 72 should receive the recommended pneumonia vaccines. Current USPSTF guidelines recommend a series of two vaccines for the best pneumonia protection.   -All adults should have a flu vaccine yearly and a tetanus vaccine every 10 years.   -All adults age 48 and older should receive the shingles vaccines (series of two vaccines).       -All adults age 38-68 who are overweight should have a diabetes screening test once every three years.   -All adults born between [birth year] and 1965 should be screened once for Hepatitis C.  -Other screening tests and preventive services for persons with diabetes include: an eye exam to screen for diabetic retinopathy, a kidney function test, a foot exam, and stricter control over your cholesterol.   -Cardiovascular screening for adults with routine risk involves an electrocardiogram (ECG) at intervals determined by your doctor.   -Colorectal cancer screenings should be done for adults age 54-65 with no increased risk factors for colorectal cancer. There are a number of acceptable methods of screening for this type of cancer. Each test has its own benefits and drawbacks. Discuss with your doctor what is most appropriate for you during your annual wellness visit. The different tests include: colonoscopy (considered the best screening method), a fecal occult blood test, a fecal DNA test, and sigmoidoscopy.    -A bone mass density test is recommended when a woman turns 65 to screen for osteoporosis. This test is only recommended one time, as a screening. Some providers will use this same test as a disease monitoring tool if you already have osteoporosis. -Breast cancer screenings are recommended every other year for women of normal risk, age 54-69.  -Cervical cancer screenings for women over age 72 are only recommended with certain risk factors.      Here is a list of your current Health Maintenance items (your personalized list of preventive services) with a due date:  Health Maintenance Due   Topic Date Due    Shingles Vaccine (1 of 2) Never done    Bone Mineral Density   Never done    Pneumococcal Vaccine (1 - PCV) Never done    Annual Well Visit  Never done    COVID-19 Vaccine (4 - Booster for José Sites series) 03/30/2022

## 2022-08-01 NOTE — PROGRESS NOTES
This is the Subsequent Medicare Annual Wellness Exam, performed 12 months or more after the Initial AWV or the last Subsequent AWV    I have reviewed the patient's medical history in detail and updated the computerized patient record. Assessment/Plan   Education and counseling provided:  Are appropriate based on today's review and evaluation  Pneumococcal Vaccine - pt declines  Bone mass measurement (DEXA)  Shingrix vaccine - pt declines    1. Medicare annual wellness visit, subsequent     Depression Risk Factor Screening:     3 most recent PHQ Screens 7/14/2022   Little interest or pleasure in doing things Not at all   Feeling down, depressed, irritable, or hopeless Not at all   Total Score PHQ 2 0       Alcohol & Drug Abuse Risk Screen    Do you average more than 1 drink per night or more than 7 drinks a week:  No    On any one occasion in the past three months have you have had more than 3 drinks containing alcohol:  No          Functional Ability and Level of Safety:    Hearing: Hearing is good. Activities of Daily Living: The home contains: handrails and grab bars  Patient needs help with:  transportation      Ambulation: with difficulty, uses a cane for long distances     Fall Risk:  Fall Risk Assessment, last 12 mths 7/14/2022   Able to walk? Yes   Fall in past 12 months? 0   Do you feel unsteady? 0   Are you worried about falling 0   Number of falls in past 12 months -   Fall with injury?  -      Abuse Screen:  Patient is not abused       Cognitive Screening    Has your family/caregiver stated any concerns about your memory: no    Cognitive screening deferred today    Health Maintenance Due     Health Maintenance Due   Topic Date Due    Shingrix Vaccine Age 49> (1 of 2) Never done    Bone Densitometry (Dexa) Screening  Never done    Pneumococcal 65+ years (1 - PCV) Never done    Medicare Yearly Exam  Never done    COVID-19 Vaccine (4 - Booster for Vaishali Benny series) 03/30/2022       Patient Care Team   Patient Care Team:  Lisa Nunez MD as PCP - General (Family Medicine)  Lisa Nunez MD as PCP - [facility] Empaneled Provider  Lisa Nunez MD as Referring Provider (Family Medicine)    History     Patient Active Problem List   Diagnosis Code    Osteopenia M85.80    Hypercholesterolemia E78.00    Rectal polyp K62.1    DDD (degenerative disc disease) Mojgan Ricci    FH: leukemia Z80.6    Duodenal ulcer - 1976 532    Subarachnoid hemorrhage - 1983 I60.9    Vitamin D deficiency E55.9    Elevated blood pressure reading R03.0    Insomnia G47.00    Hypertensive urgency I16.0    Suspected cerebrovascular accident R09.89    Bilateral pseudophakia Z96.1    Primary open angle glaucoma of both eyes, moderate stage H40.1132    Thyroid nodule E04.1    Suspected cerebrovascular accident (CVA) R09.89    Basal cell carcinoma (BCC) of skin of neck C44.41    Suture granuloma T81.89XA     Past Medical History:   Diagnosis Date    DDD (degenerative disc disease)     Duodenal ulcer 1976    FH: leukemia     Headache(784.0)     Hypercholesterolemia     Osteopenia     Osteoporosis     Rectal polyp     Subarachnoid hemorrhage ([facility] [address] 1983      Past Surgical History:   Procedure Laterality Date    HX CYSTOCELE REPAIR      HX HYSTERECTOMY      2/2 fibroids    HX MOHS PROCEDURES      HX ORTHOPAEDIC      left foot surgery    HX OTHER SURGICAL  07/15/2020    Glaucoma surgery    HX UROLOGICAL      bladder surgery    [place] REPAIR OF RECTOCELE       Current Outpatient Medications   Medication Sig Dispense Refill    cholecalciferol, vitamin D3, (Vitamin D3) 50 mcg (2,000 unit) tab Take 1 Tab by mouth daily. timolol (TIMOPTIC) 0.5 % ophthalmic solution Administer 1 Drop to right eye daily.        No Known Allergies    Family History   Problem Relation Age of Onset    Cancer Sister         leukemia    Cancer Brother         leukemia     Social History     Tobacco Use    Smoking status: Former     Packs/day: 0.50     Years: 20.00     Pack years: 10.00     Types: Cigarettes     Quit date: 7/15/1976     Years since quittin.0    Smokeless tobacco: Never   Substance Use Topics    Alcohol use: No       Cooper Ray, was evaluated through a synchronous (real-time) audio-video encounter. The patient (or guardian if applicable) is aware that this is a billable service, which includes applicable co-pays. This Virtual Visit was conducted with patient's (and/or legal guardian's) consent. The visit was conducted pursuant to the emergency declaration under the [address] [facility], [address] [place] [address] authority and the [employer] and [employer] General Act. Patient identification was verified, and a caregiver was present when appropriate. The patient was located at: Home: [facility]  The provider was located at:  Facility (Appt Department): [address] [place] [address]       He Mathur MD

## 2022-09-26 ENCOUNTER — TELEPHONE (OUTPATIENT)
Dept: FAMILY MEDICINE CLINIC | Age: 87
End: 2022-09-26

## 2022-09-26 NOTE — TELEPHONE ENCOUNTER
Spoke with pt   She reports BP possibly high  Did not check BP  Pt will go to urgent care for BP issues if she can find transportation  Appt scheduled for 10-3-22 with Dr Angela Ponce

## 2022-09-26 NOTE — TELEPHONE ENCOUNTER
PT states she is feeling light headed and is concerned about her BP. Pt would like for the nurse to contact her.

## 2022-10-15 ENCOUNTER — APPOINTMENT (OUTPATIENT)
Dept: MRI IMAGING | Age: 87
DRG: 305 | End: 2022-10-15
Attending: EMERGENCY MEDICINE
Payer: MEDICARE

## 2022-10-15 ENCOUNTER — APPOINTMENT (OUTPATIENT)
Dept: CT IMAGING | Age: 87
DRG: 305 | End: 2022-10-15
Attending: EMERGENCY MEDICINE
Payer: MEDICARE

## 2022-10-15 ENCOUNTER — HOSPITAL ENCOUNTER (INPATIENT)
Age: 87
LOS: 1 days | Discharge: HOME | DRG: 305 | End: 2022-10-16
Attending: EMERGENCY MEDICINE | Admitting: FAMILY MEDICINE
Payer: MEDICARE

## 2022-10-15 ENCOUNTER — APPOINTMENT (OUTPATIENT)
Dept: GENERAL RADIOLOGY | Age: 87
DRG: 305 | End: 2022-10-15
Attending: EMERGENCY MEDICINE
Payer: MEDICARE

## 2022-10-15 DIAGNOSIS — R06.09 DYSPNEA ON EXERTION: ICD-10-CM

## 2022-10-15 DIAGNOSIS — R29.90 STROKE-LIKE SYMPTOMS: ICD-10-CM

## 2022-10-15 DIAGNOSIS — I16.0 HYPERTENSIVE URGENCY: ICD-10-CM

## 2022-10-15 DIAGNOSIS — R27.0 ATAXIA: Primary | ICD-10-CM

## 2022-10-15 DIAGNOSIS — I10 HYPERTENSION, UNSPECIFIED TYPE: ICD-10-CM

## 2022-10-15 DIAGNOSIS — R42 DIZZINESS: ICD-10-CM

## 2022-10-15 PROBLEM — I16.1 HYPERTENSIVE EMERGENCY: Status: ACTIVE | Noted: 2022-10-15

## 2022-10-15 LAB
ALBUMIN SERPL-MCNC: 3.3 G/DL (ref 3.5–5)
ALBUMIN/GLOB SERPL: 0.7 {RATIO} (ref 1.1–2.2)
ALP SERPL-CCNC: 100 U/L (ref 45–117)
ALT SERPL-CCNC: 17 U/L (ref 12–78)
ANION GAP SERPL CALC-SCNC: 2 MMOL/L (ref 5–15)
APPEARANCE UR: CLEAR
APPEARANCE UR: CLEAR
AST SERPL-CCNC: 18 U/L (ref 15–37)
BACTERIA URNS QL MICRO: NEGATIVE /HPF
BACTERIA URNS QL MICRO: NEGATIVE /HPF
BASOPHILS # BLD: 0 K/UL (ref 0–0.1)
BASOPHILS NFR BLD: 0 % (ref 0–1)
BILIRUB SERPL-MCNC: 0.5 MG/DL (ref 0.2–1)
BILIRUB UR QL: NEGATIVE
BILIRUB UR QL: NEGATIVE
BUN SERPL-MCNC: 22 MG/DL (ref 6–20)
BUN/CREAT SERPL: 25 (ref 12–20)
CALCIUM SERPL-MCNC: 9.2 MG/DL (ref 8.5–10.1)
CHLORIDE SERPL-SCNC: 102 MMOL/L (ref 97–108)
CHOLEST SERPL-MCNC: 200 MG/DL
CO2 SERPL-SCNC: 33 MMOL/L (ref 21–32)
COLOR UR: ABNORMAL
COLOR UR: ABNORMAL
COMMENT, HOLDF: NORMAL
COMMENT, HOLDF: NORMAL
CREAT SERPL-MCNC: 0.88 MG/DL (ref 0.55–1.02)
DIFFERENTIAL METHOD BLD: ABNORMAL
EOSINOPHIL # BLD: 0.1 K/UL (ref 0–0.4)
EOSINOPHIL NFR BLD: 1 % (ref 0–7)
EPITH CASTS URNS QL MICRO: ABNORMAL /LPF
EPITH CASTS URNS QL MICRO: ABNORMAL /LPF
ERYTHROCYTE [DISTWIDTH] IN BLOOD BY AUTOMATED COUNT: 13 % (ref 11.5–14.5)
EST. AVERAGE GLUCOSE BLD GHB EST-MCNC: 131 MG/DL
GLOBULIN SER CALC-MCNC: 4.5 G/DL (ref 2–4)
GLUCOSE BLD STRIP.AUTO-MCNC: 87 MG/DL (ref 65–117)
GLUCOSE SERPL-MCNC: 115 MG/DL (ref 65–100)
GLUCOSE UR STRIP.AUTO-MCNC: NEGATIVE MG/DL
GLUCOSE UR STRIP.AUTO-MCNC: NEGATIVE MG/DL
HBA1C MFR BLD: 6.2 % (ref 4–5.6)
HCT VFR BLD AUTO: 48.1 % (ref 35–47)
HDLC SERPL-MCNC: 66 MG/DL
HDLC SERPL: 3 {RATIO} (ref 0–5)
HGB BLD-MCNC: 15.1 G/DL (ref 11.5–16)
HGB UR QL STRIP: ABNORMAL
HGB UR QL STRIP: ABNORMAL
IMM GRANULOCYTES # BLD AUTO: 0 K/UL (ref 0–0.04)
IMM GRANULOCYTES NFR BLD AUTO: 0 % (ref 0–0.5)
INR PPP: 1 (ref 0.9–1.1)
KETONES UR QL STRIP.AUTO: NEGATIVE MG/DL
KETONES UR QL STRIP.AUTO: NEGATIVE MG/DL
LDLC SERPL CALC-MCNC: 119.4 MG/DL (ref 0–100)
LEUKOCYTE ESTERASE UR QL STRIP.AUTO: ABNORMAL
LEUKOCYTE ESTERASE UR QL STRIP.AUTO: NEGATIVE
LYMPHOCYTES # BLD: 1.6 K/UL (ref 0.8–3.5)
LYMPHOCYTES NFR BLD: 18 % (ref 12–49)
MAGNESIUM SERPL-MCNC: 2.5 MG/DL (ref 1.6–2.4)
MCH RBC QN AUTO: 29.3 PG (ref 26–34)
MCHC RBC AUTO-ENTMCNC: 31.4 G/DL (ref 30–36.5)
MCV RBC AUTO: 93.2 FL (ref 80–99)
MONOCYTES # BLD: 0.9 K/UL (ref 0–1)
MONOCYTES NFR BLD: 10 % (ref 5–13)
NEUTS SEG # BLD: 6.3 K/UL (ref 1.8–8)
NEUTS SEG NFR BLD: 71 % (ref 32–75)
NITRITE UR QL STRIP.AUTO: NEGATIVE
NITRITE UR QL STRIP.AUTO: NEGATIVE
NRBC # BLD: 0 K/UL (ref 0–0.01)
NRBC BLD-RTO: 0 PER 100 WBC
PH UR STRIP: 8 [PH] (ref 5–8)
PH UR STRIP: 8 [PH] (ref 5–8)
PLATELET # BLD AUTO: 235 K/UL (ref 150–400)
PMV BLD AUTO: 10.9 FL (ref 8.9–12.9)
POTASSIUM SERPL-SCNC: 3.5 MMOL/L (ref 3.5–5.1)
PROT SERPL-MCNC: 7.8 G/DL (ref 6.4–8.2)
PROT UR STRIP-MCNC: NEGATIVE MG/DL
PROT UR STRIP-MCNC: NEGATIVE MG/DL
PROTHROMBIN TIME: 10.4 SEC (ref 9–11.1)
RBC # BLD AUTO: 5.16 M/UL (ref 3.8–5.2)
RBC #/AREA URNS HPF: ABNORMAL /HPF (ref 0–5)
RBC #/AREA URNS HPF: ABNORMAL /HPF (ref 0–5)
SAMPLES BEING HELD,HOLD: NORMAL
SAMPLES BEING HELD,HOLD: NORMAL
SERVICE CMNT-IMP: NORMAL
SODIUM SERPL-SCNC: 137 MMOL/L (ref 136–145)
SP GR UR REFRACTOMETRY: 1.01 (ref 1–1.03)
SP GR UR REFRACTOMETRY: 1.01 (ref 1–1.03)
TRIGL SERPL-MCNC: 73 MG/DL (ref ?–150)
TROPONIN-HIGH SENSITIVITY: 6 NG/L (ref 0–51)
TROPONIN-HIGH SENSITIVITY: 6 NG/L (ref 0–51)
TSH SERPL DL<=0.05 MIU/L-ACNC: 1.54 UIU/ML (ref 0.36–3.74)
UA: UC IF INDICATED,UAUC: ABNORMAL
UR CULT HOLD, URHOLD: NORMAL
UROBILINOGEN UR QL STRIP.AUTO: 0.2 EU/DL (ref 0.2–1)
UROBILINOGEN UR QL STRIP.AUTO: 0.2 EU/DL (ref 0.2–1)
VLDLC SERPL CALC-MCNC: 14.6 MG/DL
WBC # BLD AUTO: 9 K/UL (ref 3.6–11)
WBC URNS QL MICRO: ABNORMAL /HPF (ref 0–4)
WBC URNS QL MICRO: ABNORMAL /HPF (ref 0–4)

## 2022-10-15 PROCEDURE — 70553 MRI BRAIN STEM W/O & W/DYE: CPT

## 2022-10-15 PROCEDURE — 0042T CT CODE NEURO PERF W CBF: CPT

## 2022-10-15 PROCEDURE — 84443 ASSAY THYROID STIM HORMONE: CPT

## 2022-10-15 PROCEDURE — 93005 ELECTROCARDIOGRAM TRACING: CPT

## 2022-10-15 PROCEDURE — 85025 COMPLETE CBC W/AUTO DIFF WBC: CPT

## 2022-10-15 PROCEDURE — 65270000046 HC RM TELEMETRY

## 2022-10-15 PROCEDURE — 82306 VITAMIN D 25 HYDROXY: CPT

## 2022-10-15 PROCEDURE — 74011250637 HC RX REV CODE- 250/637: Performed by: EMERGENCY MEDICINE

## 2022-10-15 PROCEDURE — 80053 COMPREHEN METABOLIC PANEL: CPT

## 2022-10-15 PROCEDURE — 74011250637 HC RX REV CODE- 250/637: Performed by: STUDENT IN AN ORGANIZED HEALTH CARE EDUCATION/TRAINING PROGRAM

## 2022-10-15 PROCEDURE — 74011250636 HC RX REV CODE- 250/636: Performed by: EMERGENCY MEDICINE

## 2022-10-15 PROCEDURE — 83036 HEMOGLOBIN GLYCOSYLATED A1C: CPT

## 2022-10-15 PROCEDURE — 36415 COLL VENOUS BLD VENIPUNCTURE: CPT

## 2022-10-15 PROCEDURE — 81001 URINALYSIS AUTO W/SCOPE: CPT

## 2022-10-15 PROCEDURE — 80061 LIPID PANEL: CPT

## 2022-10-15 PROCEDURE — 70496 CT ANGIOGRAPHY HEAD: CPT

## 2022-10-15 PROCEDURE — 74011000636 HC RX REV CODE- 636: Performed by: STUDENT IN AN ORGANIZED HEALTH CARE EDUCATION/TRAINING PROGRAM

## 2022-10-15 PROCEDURE — 74011000250 HC RX REV CODE- 250

## 2022-10-15 PROCEDURE — 84484 ASSAY OF TROPONIN QUANT: CPT

## 2022-10-15 PROCEDURE — 70450 CT HEAD/BRAIN W/O DYE: CPT

## 2022-10-15 PROCEDURE — 85610 PROTHROMBIN TIME: CPT

## 2022-10-15 PROCEDURE — 74011250636 HC RX REV CODE- 250/636: Performed by: RADIOLOGY

## 2022-10-15 PROCEDURE — 99285 EMERGENCY DEPT VISIT HI MDM: CPT

## 2022-10-15 PROCEDURE — A9576 INJ PROHANCE MULTIPACK: HCPCS | Performed by: RADIOLOGY

## 2022-10-15 PROCEDURE — 71046 X-RAY EXAM CHEST 2 VIEWS: CPT

## 2022-10-15 PROCEDURE — 82962 GLUCOSE BLOOD TEST: CPT

## 2022-10-15 PROCEDURE — 83735 ASSAY OF MAGNESIUM: CPT

## 2022-10-15 PROCEDURE — 4A03X5D MEASUREMENT OF ARTERIAL FLOW, INTRACRANIAL, EXTERNAL APPROACH: ICD-10-PCS

## 2022-10-15 RX ORDER — GUAIFENESIN 100 MG/5ML
81 LIQUID (ML) ORAL
Status: COMPLETED | OUTPATIENT
Start: 2022-10-15 | End: 2022-10-15

## 2022-10-15 RX ORDER — ATORVASTATIN CALCIUM 20 MG/1
80 TABLET, FILM COATED ORAL
Status: DISCONTINUED | OUTPATIENT
Start: 2022-10-15 | End: 2022-10-16 | Stop reason: HOSPADM

## 2022-10-15 RX ORDER — ACETAMINOPHEN 650 MG/1
650 SUPPOSITORY RECTAL
Status: DISCONTINUED | OUTPATIENT
Start: 2022-10-15 | End: 2022-10-16 | Stop reason: HOSPADM

## 2022-10-15 RX ORDER — GUAIFENESIN 100 MG/5ML
81 LIQUID (ML) ORAL DAILY
Status: DISCONTINUED | OUTPATIENT
Start: 2022-10-16 | End: 2022-10-16 | Stop reason: HOSPADM

## 2022-10-15 RX ORDER — ATORVASTATIN CALCIUM 20 MG/1
80 TABLET, FILM COATED ORAL DAILY
Status: DISCONTINUED | OUTPATIENT
Start: 2022-10-16 | End: 2022-10-15

## 2022-10-15 RX ORDER — BUDESONIDE 0.5 MG/2ML
500 INHALANT ORAL
Status: DISCONTINUED | OUTPATIENT
Start: 2022-10-15 | End: 2022-10-16 | Stop reason: HOSPADM

## 2022-10-15 RX ORDER — HYDRALAZINE HYDROCHLORIDE 20 MG/ML
10 INJECTION INTRAMUSCULAR; INTRAVENOUS
Status: DISCONTINUED | OUTPATIENT
Start: 2022-10-15 | End: 2022-10-16 | Stop reason: HOSPADM

## 2022-10-15 RX ORDER — ACETAMINOPHEN 325 MG/1
650 TABLET ORAL
Status: DISCONTINUED | OUTPATIENT
Start: 2022-10-15 | End: 2022-10-16 | Stop reason: HOSPADM

## 2022-10-15 RX ORDER — HYDRALAZINE HYDROCHLORIDE 20 MG/ML
10 INJECTION INTRAMUSCULAR; INTRAVENOUS AS NEEDED
Status: DISCONTINUED | OUTPATIENT
Start: 2022-10-15 | End: 2022-10-15

## 2022-10-15 RX ADMIN — BUDESONIDE 500 MCG: 0.5 SUSPENSION RESPIRATORY (INHALATION) at 21:20

## 2022-10-15 RX ADMIN — IOPAMIDOL 100 ML: 755 INJECTION, SOLUTION INTRAVENOUS at 14:00

## 2022-10-15 RX ADMIN — SODIUM CHLORIDE 500 ML: 9 INJECTION, SOLUTION INTRAVENOUS at 14:40

## 2022-10-15 RX ADMIN — IOPAMIDOL 50 ML: 755 INJECTION, SOLUTION INTRAVENOUS at 14:00

## 2022-10-15 RX ADMIN — GADOTERIDOL 10 ML: 279.3 INJECTION, SOLUTION INTRAVENOUS at 16:44

## 2022-10-15 RX ADMIN — ASPIRIN 81 MG: 81 TABLET, CHEWABLE ORAL at 16:00

## 2022-10-15 RX ADMIN — ATORVASTATIN CALCIUM 80 MG: 20 TABLET, FILM COATED ORAL at 23:08

## 2022-10-15 NOTE — ED TRIAGE NOTES
Pt arrived c/o chest discomfort and dull headache. States she has been feeling intermittently shaky and weak, with some shortness of breath overnight. Recently diagnosed with emphysema.

## 2022-10-15 NOTE — H&P
Admission H&P     Name: Elliott Martínez MRN: [id number]  Sex: Female   YOB: 1930  Age: 80 y.o. PCP: Navya Ahn MD     Source of Information: patient, medical records    Chief complaint: unsteady gate     History of Present Illness  Elliott Martínez is a 80 y.o. female with PMHx of Emphysema, open-angle glaucoma, HLD, osteopenia, anxiety, and dental carries who presents to the ER complaining of: unsteady gate, shortness of breath, light-headedness, and headache. Patient states that she has been unsteady on her feet ever since she had surgery for her glaucoma, which resulted in eyelid droop. Patient states that the lightheadedness has been occurring intermittently for the last 3 to 4 weeks. Shortness of breath is also been intermittent. Of note patient was recently diagnosed with emphysema and started on Pulmicort. Headache started this morning. Patient takes her blood pressure regularly at home and gets readings in the 152Z to 297I systolic. Patient also endorses dental pain from a dental carry and states that she is due for tooth extraction. Patient denies any vision changes, numbness, weakness, changes in bowel or bladder habits, chest pain, leg swelling, palpitations. No trouble with swallowing or voice changes.       In the ER:      Vitals:  Patient Vitals for the past 8 hrs:   Temp Pulse Resp BP SpO2   10/15/22 1434 -- 73 19 (!) 206/109 93 %   10/15/22 1334 -- 70 18 (!) 231/105 97 %   10/15/22 1234 -- 73 22 (!) 221/113 98 %   10/15/22 1156 -- 76 22 -- 97 %   10/15/22 1155 -- -- -- -- 97 %   10/15/22 1129 98.7 °F (37.1 °C) 73 16 (!) 179/106 97 %         Labs:   Recent Labs     10/15/22  1145   WBC 9.0   HGB 15.1   HCT 48.1*        Recent Labs     10/15/22  1145      K 3.5      CO2 33*   BUN 22*   CREA 0.88   *   CA 9.2   MG 2.5*     Recent Labs     10/15/22  1145      TP 7.8   ALB 3.3*   GLOB 4.5*     Recent Labs     10/15/22  1145   INR 1.0 PTP 10.4      No results for input(s): PHI, PCO2I, PO2I, FIO2I in the last 72 hours. No results for input(s): CPK, CKMB, TROIQ, BNPP in the last 72 hours. Imaging:   CXR:  CXR Results  (Last 48 hours)                 10/15/22 1230  XR CHEST PA LAT Final result    Impression:  No acute findings. Narrative:  EXAM: XR CHEST PA LAT       INDICATION: shortness of breath        COMPARISON: None       TECHNIQUE: PA and lateral chest views       FINDINGS:   Cardiac silhouette within normal limits. Aorta is tortuous. Lungs are grossly   clear, save linear atelectasis/scarring in the right midlung. Pleural spaces are   grossly clear. Left humeral head surgical anchor screw. CT:   CT Results  (Last 48 hours)                 10/15/22 1404  CT CODE NEURO PERF W CBF Final result    Impression:  No acute intracranial process. Moderate chronic microvascular ischemic change and mild cerebral atrophy. Mild atherosclerotic changes at the origin of the right ICA. There is no major vessel occlusion. Small thyroid hypodensities require no imaging follow-up. There is no hemodynamically significant stenosis, aneurysm or dissection   identified. Multilevel severe canal and foraminal stenoses of the cervical spine. .               Narrative:  Clinical history: Stroke   INDICATION:   Stroke       COMPARISON:  9/1/2020   CONTRAST: 100ml Isovue 370   TECHNIQUE:     CT dose reduction was achieved through use of a standardized protocol tailored   for this examination and automatic exposure control for dose modulation. Following the uneventful administration of Isovue-370 contrast material, axial   CT angiography of the head and neck was performed. Coronal and sagittal   reconstructions were obtained. 3D MAXIMAL INTENSITY PROJECTION reconstructed imaging of the cranial vasculature   in both the coronal and sagittal plane was performed. CTA perfusion imaging was also obtained. Reconstructions were created with color   coding of axial time to peak, axial blood volume, axial blood flow, axial mean   transit time and axial T Max. The study was analyzed by the [address] [employer]. Algorithm   FINDINGS:   Periventricular and scattered hypodensities in the cerebral white matter. Aortic   atherosclerotic change. No apical pneumothorax. Interval micronodules at the   apices on the right and on the left. The left vertebral artery arises directly   from the aortic arch. Small thyroid hypodensities recurrent no imaging   follow-up. .  No large thyroid lesion. No acute cervical process. Multilevel   facet degenerative change and disc degenerative change. Multilevel severe canal   and foraminal stenoses cervical spine. . The paranasal sinuses are clear. CTA NECK:    There is conventional three vessel arch anatomy. There is mild atherosclerotic   changes the origin of the right ICA which demonstrate a retropharyngeal course. Minimal atherosclerotic change origin of the left ICA. Francois Karsten There is  less than 20%stenosis in the right internal carotid artery utilizing   NASCET criteria. There is  0%stenosis in the left internal carotid artery utilizing NASCET   criteria. CTA HEAD:   The right vertebral artery is dominant mild atherosclerotic change of the   cavernous ICAs. There is a posterior communicating artery on the left. A2 and A3   segments are unremarkable. . The basilar artery and its branches are normal. The   internal carotid, anterior cerebral, and middle cerebral arteries are patent. There is no flow-limiting intracranial stenosis. There is no aneurysm. There are   no sizable posterior communicating arteries. CT PERFUSION: No evidence of definite perfusion mismatch. Suggestion of ischemic   change in the right temporal region is felt to be artifactual in nature.    R CBF<30% :0   Tmax >6s: 4 mL           10/15/22 1359  CTA CODE NEURO HEAD AND NECK W CONT Final result    Impression:  No acute intracranial process. Moderate chronic microvascular ischemic change and mild cerebral atrophy. Mild atherosclerotic changes at the origin of the right ICA. There is no major vessel occlusion. Small thyroid hypodensities require no imaging follow-up. There is no hemodynamically significant stenosis, aneurysm or dissection   identified. Multilevel severe canal and foraminal stenoses of the cervical spine. .               Narrative:  Clinical history: Stroke   INDICATION:   Stroke       COMPARISON:  9/1/2020   CONTRAST: 100ml Isovue 370   TECHNIQUE:     CT dose reduction was achieved through use of a standardized protocol tailored   for this examination and automatic exposure control for dose modulation. Following the uneventful administration of Isovue-370 contrast material, axial   CT angiography of the head and neck was performed. Coronal and sagittal   reconstructions were obtained. 3D MAXIMAL INTENSITY PROJECTION reconstructed imaging of the cranial vasculature   in both the coronal and sagittal plane was performed. CTA perfusion imaging was also obtained. Reconstructions were created with color   coding of axial time to peak, axial blood volume, axial blood flow, axial mean   transit time and axial T Max. The study was analyzed by the [address]. Algorithm   FINDINGS:   Periventricular and scattered hypodensities in the cerebral white matter. Aortic   atherosclerotic change. No apical pneumothorax. Interval micronodules at the   apices on the right and on the left. The left vertebral artery arises directly   from the aortic arch. Small thyroid hypodensities recurrent no imaging   follow-up. .  No large thyroid lesion. No acute cervical process. Multilevel   facet degenerative change and disc degenerative change. Multilevel severe canal   and foraminal stenoses cervical spine. . The paranasal sinuses are clear. CTA NECK:    There is conventional three vessel arch anatomy. There is mild atherosclerotic   changes the origin of the right ICA which demonstrate a retropharyngeal course. Minimal atherosclerotic change origin of the left ICA. Lawernce Roughen There is  less than 20%stenosis in the right internal carotid artery utilizing   NASCET criteria. There is  0%stenosis in the left internal carotid artery utilizing NASCET   criteria. CTA HEAD:   The right vertebral artery is dominant mild atherosclerotic change of the   cavernous ICAs. There is a posterior communicating artery on the left. A2 and A3   segments are unremarkable. . The basilar artery and its branches are normal. The   internal carotid, anterior cerebral, and middle cerebral arteries are patent. There is no flow-limiting intracranial stenosis. There is no aneurysm. There are   no sizable posterior communicating arteries. CT PERFUSION: No evidence of definite perfusion mismatch. Suggestion of ischemic   change in the right temporal region is felt to be artifactual in nature. R CBF<30% :0   Tmax >6s: 4 mL           10/15/22 1205  CT HEAD WO CONT Final result    Impression:      1. No acute intracranial abnormality. 2. Slightly advanced microvascular ischemic and age-related change since prior   study. Narrative:  EXAM: CT HEAD WO CONT       INDICATION: headache, generalized weakness       COMPARISON: 9/1/2020. CONTRAST: None. TECHNIQUE: Unenhanced CT of the head was performed using 5 mm images. Brain and   bone windows were generated. Coronal and sagittal reformats. CT dose reduction   was achieved through use of a standardized protocol tailored for this   examination and automatic exposure control for dose modulation. FINDINGS:   Generalized prominence of cerebral sulci and ventricles is mildly increased but   commensurate with age and stable. . Periventricular white matter low-density is   moderate and slightly advanced since the prior study. . There is no intracranial   hemorrhage, extra-axial collection, or mass effect. The basilar cisterns are   open. No CT evidence of acute infarct. The bone windows demonstrate no abnormalities. The visualized portions of the   paranasal sinuses and mastoid air cells are clear. Treatment: S/p 500 cc bolus and aspirin 81      Review of Systems  Review of Systems   Constitutional:  Negative for chills and fever. HENT:  Positive for dental problem. Negative for sore throat, trouble swallowing and voice change. Eyes:  Negative for photophobia, pain, redness and visual disturbance. Respiratory:  Positive for shortness of breath. Negative for cough and wheezing. Cardiovascular:  Negative for chest pain, palpitations and leg swelling. Gastrointestinal:  Negative for abdominal pain, diarrhea, nausea and vomiting. Genitourinary:  Negative for difficulty urinating. Musculoskeletal:  Positive for gait problem. Negative for arthralgias and myalgias. Skin:  Negative for rash and wound. Neurological:  Positive for light-headedness. Negative for tremors, syncope, speech difficulty, weakness and numbness. Psychiatric/Behavioral:  Negative for confusion. Home Medications   Prior to Admission medications    Medication Sig Start Date End Date Taking? Authorizing Provider   cholecalciferol, vitamin D3, (Vitamin D3) 50 mcg (2,000 unit) tab Take 1 Tab by mouth daily. Provider, Historical   timolol (TIMOPTIC) 0.5 % ophthalmic solution Administer 1 Drop to right eye daily.  6/8/10   Provider, Historical       Allergies  No Known Allergies    Past Medical History  Past Medical History:   Diagnosis Date    DDD (degenerative disc disease)     Duodenal ulcer 1976    FH: leukemia     Headache(784.0)     Hypercholesterolemia     Osteopenia     Osteoporosis     Rectal polyp     Subarach[facility]oid hemorrhage ([facility] [address] 1983        Previous Hospitalization(s)  Past Surgical History:   Procedure Laterality Date    HX CYSTOCELE REPAIR      HX HYSTERECTOMY      2/2 fibroids    HX MOHS PROCEDURES      HX ORTHOPAEDIC      left foot surgery    HX OTHER SURGICAL  07/15/2020    Glaucoma surgery    HX UROLOGICAL      bladder surgery    [place] REPAIR OF RECTOCELE         Social History  Social History     Socioeconomic History    Marital status:      Spouse name: Not on file    Number of children: Not on file    Years of education: Not on file    Highest education level: Not on file   Occupational History    Not on file   Tobacco Use    Smoking status: Former     Packs/day: 0.50     Years: 20.00     Pack years: 10.00     Types: Cigarettes     Quit date: 7/15/1976     Years since quittin.2    Smokeless tobacco: Never   Vaping Use    Vaping Use: Never used   Substance and Sexual Activity    Alcohol use: No    Drug use: Not on file    Sexual activity: Not Currently   Other Topics Concern    Not on file   Social History Narrative    Not on file     Social Determinants of Health     Financial Resource Strain: Low Risk     Difficulty of Paying Living Expenses: Not hard at all   Food Insecurity: No Food Insecurity    Worried About Running Out of Food in the Last Year: Never true    Ran Out of Food in the Last Year: Never true   Transportation Needs: Not on file   Physical Activity: Not on file   Stress: Not on file   Social Connections: Not on file   Intimate Partner Violence: Not on file   Housing Stability: Not on file       Family History  Family History   Problem Relation Age of Onset    Cancer Sister         leukemia    Cancer Brother         leukemia          Patient resides  x  Independently      With family care      Assisted living      SNF      Ambulates  x  Independently      With cane       Assisted walker      Alcohol history   x  None     Social     Chronic     Smoking history    None   x  Former smoker: Quit .  15-20  yr ~0.5 ppd     Current smoker     Drug history  x  None     Former drug user     Current drug user     Code status  x  Full code     DNR/DNI     Partial      Code status discussed with the patient/caregivers. Physical Exam  General: No acute distress. Alert. Cooperative. Head: Normocephalic. Atraumatic. Eyes:              Conjunctiva pink. Sclera white. PERRL. Mild left eyelid droop   Throat: Moist mucous membranes. No tonsillar exudates or erythema. Neck: No lymphadenopathy. Respiratory: CTAB. No w/r/r/c. No accessory muscle use. Cardiovascular: Regular Rhythm. No m/r/g.    GI: Nontender. No rebound/guarding. Non-distended. Extremities: No LE edema. Distal pulses intact. Musculoskeletal: Full ROM in all extremities. Skin: Warm, dry. No rashes. Notable skin damage on sun exposed areas including head, neck and arms. Neuro: Alert and oriented. CN II-XII intact; No nystagmus, face symmetric, tongue/palate midline. Strength 5/5 all four extremities. Sensation intact in all extremities. Normal finger-nose-finger test. No pronation drift. Assessment and Plan     Xiang Alcocer is a 80 y.o. female with PMHx of Emphysema, open-angle glaucoma, HLD, osteopenia, anxiety, and dental carries who is admitted for stroke r/o. Unsteady gate: Patient reports that her gait has been unsteady ever since her glaucoma surgery that resulted in left eyelid droop. Her eyelid gets in the way of her being able to see properly. Ddx includes CVA, TIA, electrolyte abnormality, vitamin D deficiency, and anxiety. CT head and neck showed moderate chronic microvascular ischemic change and mild cerebral atrophy as well as mild atherosclerotic changes at the origin of the right ICA but no acute process. S/p 81 mg ASA and 500 cc bolus.  Neuro exam normal.   - Admit to Neuro/Stroke, VS per unit  - Cardiac monitoring   - Regular diet bedside swallow   - ASA 81mg daily  - Lipitor 80mg daily  - Permissive HTN  - Neurology consulted, appreciate recs  - MRI brain  - TSH, lipids, A1c  - PT/OT/CM  - Neuro/vasc checks per unit  - Will not order echo as PFO r/o by Echo on 9/2/2020   - SCDs    Hypertensive emergency: POA BP was 179/106. Highest recording 231/105. Patient checks her BP at home and routinely gets in the 100s to 120s at home. Patient has a history of whitecoat hypertension in the past as well as elevated blood pressure when she is stressed. No home medications for HTN. Stroke r/o. - Permissive HTN up to 220/120   - Hydralazine 10 mg q6h PRN for SBP >220 or DBP >110  - Aim for 20% reduction in BP if stroke ruled out. Hyperlipidemia: Last lipid panel:   Lab Results   Component Value Date/Time    Cholesterol, total 198 07/14/2022 10:33 AM    HDL Cholesterol 69 07/14/2022 10:33 AM    LDL, calculated 114.4 (H) 07/14/2022 10:33 AM    VLDL, calculated 14.6 07/14/2022 10:33 AM    Triglyceride 73 07/14/2022 10:33 AM    CHOL/HDL Ratio 2.9 07/14/2022 10:33 AM      - Not on any home medications      Open-angle glaucoma: s/p glaucoma surgery with good response now on home timolol 0.5% ophthalmic solution.  - Continue home timolol ophthalmic solution    Emphysema: Chronic. Patient has approximately 10-pack-year history of tobacco use. Quit in 1976. Was recently started on home Pulmicort for shortness of breath. 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 Osteopenia: Was on home vitamin D.   - Consider OP follow-up     Vitamin D deficiency: Vitamin D from 2014 was 25.8. Was on home vitamin D3. Patient no longer taking this medication. Anxiety: per son, pt checking BP multiple times each day and worries . Pt not interested in any anxiety mediations. Dental carries: per patient, due for extraction.   - Follow-up in the OP setting     FEN/GI - NPO. Activity - Out of bed with assistance  DVT prophylaxis - SCDs  GI prophylaxis - Not indicated at this time  Fall prophylaxis - Fall precautions ordered.   Disposition - Admit to Medical. Plan to d/c to Home. Code Status - Full, discussed with patient / caregivers.   Next of [address] Name and Contact Ani Dawson, (son) [phone]      Patient Jim Reyes will be discussed with Brittney Blum MD    3:59 PM, 10/15/22  Darrion Linder MD  Family Medicine Resident       For Billing    Chief Complaint   Patient presents with    Chest Pain    Headache       Hospital Problems  Date Reviewed: 2/23/2021            Codes Class Noted POA    * (Principal) Ataxia ICD-10-CM: R27.0  ICD-9-CM: 781.3  10/15/2022 Unknown        Hypertensive emergency ICD-10-CM: I16.1  ICD-9-CM: 401.9  10/15/2022 Unknown

## 2022-10-15 NOTE — ED NOTES
Verbal shift change report given to [facility], RN (oncoming nurse) by Kennedy Hatfield RN (offgoing nurse). Report included the following information SBAR, Kardex, MAR and Recent Results.

## 2022-10-15 NOTE — ED PROVIDER NOTES
Verna Amos is a 80 F with h/o SAH in  and recent diagnosis of emphysema and pulmonary fibrosis who presents to the ED with complaint of elevated blood pressure and generalized weakness/fatigue and shakiness. She states she did not sleep well overnight and had periods where she felt short of breath but when her son checked her O2 saturation in the morning it was 98%. Patient states her O2 sat was normally 94%.            Past Medical History:   Diagnosis Date    DDD (degenerative disc disease)     Duodenal ulcer     FH: leukemia     Headache(784.0)     Hypercholesterolemia     Osteopenia     Osteoporosis     Rectal polyp     Subarachnoid hemorrhage ([facility] [address]        Past Surgical History:   Procedure Laterality Date    HX CYSTOCELE REPAIR      HX HYSTERECTOMY      2/2 fibroids    HX MOHS PROCEDURES      HX ORTHOPAEDIC      left foot surgery    HX OTHER SURGICAL  07/15/2020    Glaucoma surgery    HX UROLOGICAL      bladder surgery    [place] REPAIR OF RECTOCELE           Family History:   Problem Relation Age of Onset    Cancer Sister         leukemia    Cancer Brother         leukemia       Social History     Socioeconomic History    Marital status:      Spouse name: Not on file    Number of children: Not on file    Years of education: Not on file    Highest education level: Not on file   Occupational History    Not on file   Tobacco Use    Smoking status: Former     Packs/day: 0.50     Years: 20.00     Pack years: 10.00     Types: Cigarettes     Quit date: 7/15/1976     Years since quittin.2    Smokeless tobacco: Never   Vaping Use    Vaping Use: Never used   Substance and Sexual Activity    Alcohol use: No    Drug use: Not on file    Sexual activity: Not Currently   Other Topics Concern    Not on file   Social History Narrative    Not on file     Social Determinants of Health     Financial Resource Strain: Low Risk     Difficulty of Paying Living Expenses: Not hard at all   Food Insecurity: No Food Insecurity    Worried About Running Out of Food in the Last Year: Never true    Ran Out of Food in the Last Year: Never true   Transportation Needs: Not on file   Physical Activity: Not on file   Stress: Not on file   Social Connections: Not on file   Intimate Partner Violence: Not on file   Housing Stability: Not on file         ALLERGIES: Patient has no known allergies. Review of Systems   Constitutional:  Positive for fatigue. Negative for fever. HENT:  Negative for sore throat. Eyes:  Negative for visual disturbance. Respiratory:  Positive for chest tightness and shortness of breath. Negative for cough. Cardiovascular:  Negative for chest pain. Gastrointestinal:  Negative for abdominal pain. Genitourinary:  Negative for dysuria. Musculoskeletal:  Negative for back pain. Skin:  Negative for rash. Neurological:  Positive for headaches. Vitals:    10/15/22 1129   BP: (!) 179/106   Pulse: 73   Resp: 16   Temp: 98.7 °F (37.1 °C)   SpO2: 97%            Physical Exam  Vitals and nursing note reviewed. Constitutional:       General: She is not in acute distress. Appearance: She is well-developed. HENT:      Head: Normocephalic and atraumatic. Mouth/Throat:      Mouth: Mucous membranes are moist.   Eyes:      Extraocular Movements: Extraocular movements intact. Conjunctiva/sclera: Conjunctivae normal.   Neck:      Trachea: Phonation normal.   Cardiovascular:      Rate and Rhythm: Normal rate. Pulmonary:      Effort: Pulmonary effort is normal. No respiratory distress. Breath sounds: No wheezing. Abdominal:      General: There is no distension. Musculoskeletal:         General: No tenderness. Normal range of motion. Cervical back: Normal range of motion. Skin:     General: Skin is warm and dry. Neurological:      General: No focal deficit present. Mental Status: She is alert. She is not disoriented.       Cranial Nerves: Cranial nerves 2-12 are intact. Motor: Motor function is intact. No abnormal muscle tone. ED EKG interpretation:  Rhythm: normal sinus rhythm; and regular . Rate (approx.): 76; Axis: normal; P wave: normal; QRS interval: normal ; ST/T wave: normal; Other findings: normal. This EKG was interpreted by Bharath Brown MD,ED Provider. MDM           1:39 PM  Patient reassessed with son at bedside. States that she normally does not have elevated BP. Son also adds that he did not note her shaking but that she was very unsteady on her feet and needed assistance to keep her from falling when she was walking which is unusual for her. PAtient remains Hypertensive, consider posterior stroke. Patients states she last felt normal when she went to bed at 8:30pm last night. Will initate level 2 code stroke,    3:01 PM   Discussed  with Dr. Torey Scott teleneuro, who has evaluated patient. HEr exam appears normal however she states she still feels like she is going to fall when standing. Would recommend starting 81mg aspirin and admit for stroke work up. Permissive HTN up to 220/120 for now until MRI r/o stroke. Perfect Serve Consult for Admission  3:05 PM    ED Room Number: ER10/10  Patient Name and age:  Jim Reyes 80 y.o.  female  Working Diagnosis:   1. Ataxia    2. Hypertension, unspecified type    3. Stroke-like symptoms        COVID-19 Suspicion:  no  Sepsis present:  no  Reassessment needed: no  Code Status:  Full Code  Readmission: no  Isolation Requirements:  no  Recommended Level of Care:  telemetry  Department:[facility] ED - [phone]  Other:  Patient underwent Level 2 stroke eval for ataxia, difficulty walking/Standing that started this morning. Patient Hypertensive in ED, 206/109. No acute findings on CT head/CTA/perfsuion studies. PAtient evaluated by Teleneurology who recommends 81mg aspirin and admit for stroke work up.   Receommend permissive HTN up to 220/120 for now until MRI results. If MRI negative for stoke would start to treat HTN.     Procedures

## 2022-10-15 NOTE — ED NOTES
Stroke Education provided to patient and the following topics were discussed    1. Patients personal risk factors for stroke are hypertension high blood pressure, maybe high cholesterol. But pt states she had this check regularly and has never been told it's an issue before. 2. Warning signs of Stroke: pt stated \"Numbness in face,arms and hands\"        * Sudden numbness or weakness of the face, arm or leg, especially on one side of          The body            * Sudden confusion, trouble speaking or understanding        * Sudden trouble seeing in one or both eyes        * Sudden trouble walking, dizziness, loss of balance or coordination        * Sudden severe headache with no known cause      3. Importance of activation Emergency Medical Services ( 9-1-1 ) immediately if experience any warning signs of stroke. 4. Be sure and schedule a follow-up appointment with your primary care doctor or any specialists as instructed. 5. You must take medicine every day to treat your risk factors for stroke. Be sure to take your medicines exactly as your doctor tells you: no more, no less. Know what your medicines are for , what they do. Anti-thrombotics /anticoagulants can help prevent strokes. You are taking the following medicine(s)  pt not on any medications currently but we have discussed aspirin given earlier and what it was for. 6.  Smoking and second-hand smoke greatly increase your risk of stroke, cardiovascular disease and death. Smoking history ended year 18's pt does has a hx of smoking in 40-50's quit in the 70's. 7. Information provided was Stroke Handouts or Verbal Education    8. Documentation of teaching completed in Patient Education Activity and on Care Plan with teaching response noted?   yes  Pt provided with  Stroke treatment and prevention notebook

## 2022-10-15 NOTE — PROGRESS NOTES
Pt changed in to hospital gown, call bell within reach, and hooked up to the monitor.  Family at bedside

## 2022-10-16 VITALS
OXYGEN SATURATION: 95 % | HEIGHT: 65 IN | HEART RATE: 87 BPM | SYSTOLIC BLOOD PRESSURE: 102 MMHG | WEIGHT: 125 LBS | BODY MASS INDEX: 20.83 KG/M2 | RESPIRATION RATE: 16 BRPM | DIASTOLIC BLOOD PRESSURE: 60 MMHG | TEMPERATURE: 97.4 F

## 2022-10-16 LAB
25(OH)D3 SERPL-MCNC: 25.9 NG/ML (ref 30–100)
ANION GAP SERPL CALC-SCNC: 3 MMOL/L (ref 5–15)
BUN SERPL-MCNC: 25 MG/DL (ref 6–20)
BUN/CREAT SERPL: 28 (ref 12–20)
CALCIUM SERPL-MCNC: 9 MG/DL (ref 8.5–10.1)
CHLORIDE SERPL-SCNC: 104 MMOL/L (ref 97–108)
CO2 SERPL-SCNC: 29 MMOL/L (ref 21–32)
CREAT SERPL-MCNC: 0.9 MG/DL (ref 0.55–1.02)
ERYTHROCYTE [DISTWIDTH] IN BLOOD BY AUTOMATED COUNT: 13.1 % (ref 11.5–14.5)
GLUCOSE SERPL-MCNC: 98 MG/DL (ref 65–100)
HCT VFR BLD AUTO: 44.4 % (ref 35–47)
HGB BLD-MCNC: 14 G/DL (ref 11.5–16)
MCH RBC QN AUTO: 29 PG (ref 26–34)
MCHC RBC AUTO-ENTMCNC: 31.5 G/DL (ref 30–36.5)
MCV RBC AUTO: 91.9 FL (ref 80–99)
NRBC # BLD: 0 K/UL (ref 0–0.01)
NRBC BLD-RTO: 0 PER 100 WBC
PLATELET # BLD AUTO: 222 K/UL (ref 150–400)
PMV BLD AUTO: 10.7 FL (ref 8.9–12.9)
POTASSIUM SERPL-SCNC: 3.5 MMOL/L (ref 3.5–5.1)
RBC # BLD AUTO: 4.83 M/UL (ref 3.8–5.2)
SODIUM SERPL-SCNC: 136 MMOL/L (ref 136–145)
WBC # BLD AUTO: 10 K/UL (ref 3.6–11)

## 2022-10-16 PROCEDURE — 74011000250 HC RX REV CODE- 250

## 2022-10-16 PROCEDURE — 99235 HOSP IP/OBS SAME DATE MOD 70: CPT | Performed by: FAMILY MEDICINE

## 2022-10-16 PROCEDURE — 94640 AIRWAY INHALATION TREATMENT: CPT

## 2022-10-16 PROCEDURE — 94761 N-INVAS EAR/PLS OXIMETRY MLT: CPT

## 2022-10-16 PROCEDURE — 97161 PT EVAL LOW COMPLEX 20 MIN: CPT

## 2022-10-16 PROCEDURE — 36415 COLL VENOUS BLD VENIPUNCTURE: CPT

## 2022-10-16 PROCEDURE — 97535 SELF CARE MNGMENT TRAINING: CPT

## 2022-10-16 PROCEDURE — 74011250637 HC RX REV CODE- 250/637: Performed by: STUDENT IN AN ORGANIZED HEALTH CARE EDUCATION/TRAINING PROGRAM

## 2022-10-16 PROCEDURE — 99222 1ST HOSP IP/OBS MODERATE 55: CPT | Performed by: PSYCHIATRY & NEUROLOGY

## 2022-10-16 PROCEDURE — 85027 COMPLETE CBC AUTOMATED: CPT

## 2022-10-16 PROCEDURE — 97116 GAIT TRAINING THERAPY: CPT

## 2022-10-16 PROCEDURE — 74011000250 HC RX REV CODE- 250: Performed by: STUDENT IN AN ORGANIZED HEALTH CARE EDUCATION/TRAINING PROGRAM

## 2022-10-16 PROCEDURE — 97165 OT EVAL LOW COMPLEX 30 MIN: CPT

## 2022-10-16 PROCEDURE — 80048 BASIC METABOLIC PNL TOTAL CA: CPT

## 2022-10-16 RX ORDER — TIMOLOL MALEATE 5 MG/ML
1 SOLUTION/ DROPS OPHTHALMIC DAILY
Status: DISCONTINUED | OUTPATIENT
Start: 2022-10-16 | End: 2022-10-16 | Stop reason: HOSPADM

## 2022-10-16 RX ORDER — CYANOCOBALAMIN (VITAMIN B-12) 500 MCG
800 TABLET ORAL DAILY
Status: DISCONTINUED | OUTPATIENT
Start: 2022-10-16 | End: 2022-10-16

## 2022-10-16 RX ORDER — BUDESONIDE 0.5 MG/2ML
500 INHALANT ORAL 2 TIMES DAILY
Qty: 60 EACH | Refills: 0 | Status: SHIPPED | OUTPATIENT
Start: 2022-10-16 | End: 2022-11-15

## 2022-10-16 RX ORDER — MELATONIN
2000 DAILY
Status: DISCONTINUED | OUTPATIENT
Start: 2022-10-16 | End: 2022-10-16 | Stop reason: HOSPADM

## 2022-10-16 RX ADMIN — ASPIRIN 81 MG: 81 TABLET, CHEWABLE ORAL at 09:00

## 2022-10-16 RX ADMIN — BUDESONIDE 500 MCG: 0.5 SUSPENSION RESPIRATORY (INHALATION) at 09:36

## 2022-10-16 RX ADMIN — TIMOLOL MALEATE 1 DROP: 5 SOLUTION/ DROPS OPHTHALMIC at 09:32

## 2022-10-16 RX ADMIN — Medication 2000 UNITS: at 09:32

## 2022-10-16 NOTE — DISCHARGE SUMMARY
[facility] [address] [place] [address]   Office [phone]  Fax [phone]       Discharge / Transfer / Off-Service Note     Name: Jim Reyes MRN: [id number]  Sex: Female   YOB: 1930  Age: 80 y.o. PCP: Fermin Knight MD     Date of admission: 10/15/2022  Date of discharge/transfer: 10/16/2022    Attending physician at admission: Uma Harden Attending physician at discharge/transfer: Brittney Blum MD  Resident physician at discharge/transfer: Dioni Andrade DO     Consultants during hospitalization  IP CONSULT TO [facility] TO [facility]     Admission diagnoses   Ataxia [R27.0]  Hypertensive emergency [I16.1]    Recommended follow-up after discharge    1. Aaliyah Guerrero MD     Things to follow up on with PCP:   - Assess for prn anxiety medication. Blood pressures seem to be quite elevated in clinical settings and with increased anxiety  - Prediabetes with A1c 6.2: Encourage lifestyle modification    Medication Changes:  START  - Vitamin D 2,000IU daily  - Continue Pulmicort for your Emphysema    STOP  - You do NOT need to continue to take the baby aspirin or the lipitor that we started here in the hospital    No other changes were made to your medications, please take all your other home medicines as previously prescribed. History of Present Illness    As per admitting provider, Dr. Zoe Alanis:   Regina iRzzo is a 80 y.o. female with PMHx of Emphysema, open-angle glaucoma, HLD, osteopenia, anxiety, and dental carries who presents to the ER complaining of: unsteady gate, shortness of breath, light-headedness, and headache. Patient states that she has been unsteady on her feet ever since she had surgery for her glaucoma, which resulted in eyelid droop. Patient states that the lightheadedness has been occurring intermittently for the last 3 to 4 weeks. Shortness of breath is also been intermittent.   Of note patient was recently diagnosed with emphysema and started on Pulmicort. Headache started this morning. Patient takes her blood pressure regularly at home and gets readings in the 369S to 076E systolic. Patient also endorses dental pain from a dental carry and states that she is due for tooth extraction. Patient denies any vision changes, numbness, weakness, changes in bowel or bladder habits, chest pain, leg swelling, palpitations. No trouble with swallowing or voice changes. [facility]  79 y/o F admitted with lightheadedness, headache, and unsteady gait with concern for stroke. Patient received MRI, CT, CXR, UA, and trops that were all negative for acute process and/or wnl. Pt received ASA 81 and lipitor 80 during hospitalization. Blood pressures were quite high, ranging up to 225/127 but 120/66 on day on morning of discharge. Hx is significant for high BP readings in medical settings and with increased anxiety. Patient reports no symptoms on morning of discharge. The following problems were addressed in the hospital:    Unsteady gate: Patient reports that her gait has been unsteady ever since her glaucoma surgery that resulted in left eyelid droop. Her eyelid gets in the way of her being able to see properly. CT head and neck showed moderate chronic microvascular ischemic change and mild cerebral atrophy as well as mild atherosclerotic changes at the origin of the right ICA but no acute process    Hypertensive emergency: POA BP was 179/106. Highest recording 231/105. Patient checks her BP at home and routinely gets in the 100s to 120s at home. Patient has a history of whitecoat hypertension in the past as well as elevated blood pressure when she is stressed.  No home medications for HTN.   - Home BP log  - Follow up outpatient to consider starting anti-hypertensive     Hyperlipidemia: Last lipid panel:         Lab Results   Component Value Date/Time     Cholesterol, total 198 07/14/2022 10:33 AM     HDL Cholesterol 69 07/14/2022 10:33 AM     LDL, calculated 114.4 (H) 07/14/2022 10:33 AM     VLDL, calculated 14.6 07/14/2022 10:33 AM     Triglyceride 73 07/14/2022 10:33 AM     CHOL/HDL Ratio 2.9 07/14/2022 10:33 AM    - Not on any home medications      Open-angle glaucoma: s/p glaucoma surgery with good response now on home timolol 0.5% ophthalmic solution.  - Continue home timolol ophthalmic solution     Emphysema: Chronic. Patient has approximately 10-pack-year history of tobacco use. Quit in 1976. Was recently started on home Pulmicort for shortness of breath. 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 Osteopenia: Was on home vitamin D.   - Continue 2KIU daily     Anxiety: per son, pt checking BP multiple times each day and worries . Pt not interested in any anxiety mediations. - Consider prn anxiety medication outpatient     Dental carries: per patient, due for extraction.   - Follow-up in the OP setting     Physical exam at discharge:  Physical Exam  General: No acute distress. Alert. Cooperative. Head: Normocephalic. Atraumatic. Eyes:              Conjunctiva pink. Sclera white. PERRL. Mild left eyelid ptosis   Throat: Moist mucous membranes. No tonsillar exudates or erythema. Neck: No lymphadenopathy. Respiratory: CTAB. No w/r/r/c. No accessory muscle use. Cardiovascular: Regular Rhythm. No m/r/g.    GI: Nontender. No rebound/guarding. Non-distended. Extremities: No LE edema. Distal pulses intact. Musculoskeletal: Full ROM in all extremities. Skin: Warm, dry. No rashes. Neuro: Alert and oriented. CN II-XII intact; No nystagmus, face symmetric, tongue/palate midline. Strength 5/5 all four extremities. Sensation intact in all extremities. Normal finger-nose-finger test. No pronation drift. Vitals: Reviewed.  Patient Vitals for the past 12 hrs:   Temp Pulse Resp BP SpO2   10/16/22 1051 97.4 °F (36.3 °C) 87 16 102/60 95 %   10/16/22 0936 -- -- -- -- 94 %   10/16/22 0819 97.6 °F (36.4 °C) 76 15 (!) 142/75 93 %   10/16/22 0700 -- 67 -- -- --   10/16/22 0330 97.4 °F (36.3 °C) 72 16 120/66 94 %     Condition at discharge: Stable. Labs  Recent Labs     10/16/22  0542 10/15/22  1145   WBC 10.0 9.0   HGB 14.0 15.1   HCT 44.4 48.1*    235     Recent Labs     10/16/22  0542 10/15/22  1145    137   K 3.5 3.5    102   CO2 29 33*   BUN 25* 22*   CREA 0.90 0.88   GLU 98 115*   CA 9.0 9.2   MG  --  2.5*     Recent Labs     10/15/22  1145   ALT 17      TBILI 0.5   TP 7.8   ALB 3.3*   GLOB 4.5*     Recent Labs     10/15/22  1430 10/15/22  1145   INR  --  1.0   PTP  --  10.4   GLUCPOC 87  --        Micro:  No results found for: CULT    Imaging:  XR CHEST PA LAT    Result Date: 10/15/2022  EXAM: XR CHEST PA LAT INDICATION: shortness of breath COMPARISON: None TECHNIQUE: PA and lateral chest views FINDINGS: Cardiac silhouette within normal limits. Aorta is tortuous. Lungs are grossly clear, save linear atelectasis/scarring in the right midlung. Pleural spaces are grossly clear. Left humeral head surgical anchor screw. No acute findings. MRI BRAIN W WO CONT    Result Date: 10/16/2022  INDICATION: Ataxia, stroke evaluation. Exam: Multiplanar pre- and postgadolinium MRI of the brain is performed with T1, T2, gradient, FLAIR and diffusion sequences. A total of 10 mL of gadolinium contrast was administered intravenously. Direct comparison is made to prior CT\CTA dated October 15, 2022. Direct comparison is also made to prior MRI dated September 2020. FINDINGS: There is mild, age-appropriate diffuse cortical atrophy. There is no restricted diffusion to suggest acute infarct. The ventricular system is normal. There are periventricular white matter hyperintensities consistent with chronic microvascular ischemic changes. . The cervicomedullary junction is normal and there is no tonsillar ectopia. There is no acute intracranial hemorrhage. There is a punctate focus of hemosiderin staining within the right temporal occipital lobe, unchanged compared to prior MRI dated September 2020. The visualized vascular flow-voids of the skull base are normal. There is no enhancing intracranial mass lesion, mass effect or herniation. There is no osseous lesion or scalp abnormality. No acute intracranial hemorrhage, enhancing mass or infarct. CT HEAD WO CONT    Result Date: 10/15/2022  EXAM: CT HEAD WO CONT INDICATION: headache, generalized weakness COMPARISON: 9/1/2020. CONTRAST: None. TECHNIQUE: Unenhanced CT of the head was performed using 5 mm images. Brain and bone windows were generated. Coronal and sagittal reformats. CT dose reduction was achieved through use of a standardized protocol tailored for this examination and automatic exposure control for dose modulation. FINDINGS: Generalized prominence of cerebral sulci and ventricles is mildly increased but commensurate with age and stable. . Periventricular white matter low-density is moderate and slightly advanced since the prior study. . There is no intracranial hemorrhage, extra-axial collection, or mass effect. The basilar cisterns are open. No CT evidence of acute infarct. The bone windows demonstrate no abnormalities. The visualized portions of the paranasal sinuses and mastoid air cells are clear. 1. No acute intracranial abnormality. 2. Slightly advanced microvascular ischemic and age-related change since prior study. CTA CODE NEURO HEAD AND NECK W CONT    Result Date: 10/15/2022  Clinical history: Stroke INDICATION:   Stroke COMPARISON:  9/1/2020 CONTRAST: 100ml Isovue 370 TECHNIQUE:  CT dose reduction was achieved through use of a standardized protocol tailored for this examination and automatic exposure control for dose modulation.  Following the uneventful administration of Isovue-370 contrast material, axial CT angiography of the head and neck was performed. Coronal and sagittal reconstructions were obtained. 3D MAXIMAL INTENSITY PROJECTION reconstructed imaging of the cranial vasculature in both the coronal and sagittal plane was performed. CTA perfusion imaging was also obtained. Reconstructions were created with color coding of axial time to peak, axial blood volume, axial blood flow, axial mean transit time and axial T Max. The study was analyzed by the [employer] [address] [employer]. Algorithm FINDINGS: Periventricular and scattered hypodensities in the cerebral white matter. Aortic atherosclerotic change. No apical pneumothorax. Interval micronodules at the apices on the right and on the left. The left vertebral artery arises directly from the aortic arch. Small thyroid hypodensities recurrent no imaging follow-up. .  No large thyroid lesion. No acute cervical process. Multilevel facet degenerative change and disc degenerative change. Multilevel severe canal and foraminal stenoses cervical spine. . The paranasal sinuses are clear. CTA NECK: There is conventional three vessel arch anatomy. There is mild atherosclerotic changes the origin of the right ICA which demonstrate a retropharyngeal course. Minimal atherosclerotic change origin of the left ICA. Rhenda Amarilis There is  less than 20%stenosis in the right internal carotid artery utilizing NASCET criteria. There is  0%stenosis in the left internal carotid artery utilizing NASCET criteria. CTA HEAD: The right vertebral artery is dominant mild atherosclerotic change of the cavernous ICAs. There is a posterior communicating artery on the left. A2 and A3 segments are unremarkable. . The basilar artery and its branches are normal. The internal carotid, anterior cerebral, and middle cerebral arteries are patent. There is no flow-limiting intracranial stenosis. There is no aneurysm. There are no sizable posterior communicating arteries. CT PERFUSION: No evidence of definite perfusion mismatch.  Suggestion of ischemic change in the right temporal region is felt to be artifactual in nature. R CBF<30% :0 Tmax >6s: 4 mL     No acute intracranial process. Moderate chronic microvascular ischemic change and mild cerebral atrophy. Mild atherosclerotic changes at the origin of the right ICA. There is no major vessel occlusion. Small thyroid hypodensities require no imaging follow-up. There is no hemodynamically significant stenosis, aneurysm or dissection identified. Multilevel severe canal and foraminal stenoses of the cervical spine. .      CT CODE NEURO PERF W CBF    Result Date: 10/15/2022  Clinical history: Stroke INDICATION:   Stroke COMPARISON:  9/1/2020 CONTRAST: 100ml Isovue 370 TECHNIQUE:  CT dose reduction was achieved through use of a standardized protocol tailored for this examination and automatic exposure control for dose modulation. Following the uneventful administration of Isovue-370 contrast material, axial CT angiography of the head and neck was performed. Coronal and sagittal reconstructions were obtained. 3D MAXIMAL INTENSITY PROJECTION reconstructed imaging of the cranial vasculature in both the coronal and sagittal plane was performed. CTA perfusion imaging was also obtained. Reconstructions were created with color coding of axial time to peak, axial blood volume, axial blood flow, axial mean transit time and axial T Max. The study was analyzed by the [address] [employer]. Algorithm FINDINGS: Periventricular and scattered hypodensities in the cerebral white matter. Aortic atherosclerotic change. No apical pneumothorax. Interval micronodules at the apices on the right and on the left. The left vertebral artery arises directly from the aortic arch. Small thyroid hypodensities recurrent no imaging follow-up. .  No large thyroid lesion. No acute cervical process. Multilevel facet degenerative change and disc degenerative change. Multilevel severe canal and foraminal stenoses cervical spine. . The paranasal sinuses are clear.  CTA NECK: There is conventional three vessel arch anatomy. There is mild atherosclerotic changes the origin of the right ICA which demonstrate a retropharyngeal course. Minimal atherosclerotic change origin of the left ICA. Trula Blew There is  less than 20%stenosis in the right internal carotid artery utilizing NASCET criteria. There is  0%stenosis in the left internal carotid artery utilizing NASCET criteria. CTA HEAD: The right vertebral artery is dominant mild atherosclerotic change of the cavernous ICAs. There is a posterior communicating artery on the left. A2 and A3 segments are unremarkable. . The basilar artery and its branches are normal. The internal carotid, anterior cerebral, and middle cerebral arteries are patent. There is no flow-limiting intracranial stenosis. There is no aneurysm. There are no sizable posterior communicating arteries. CT PERFUSION: No evidence of definite perfusion mismatch. Suggestion of ischemic change in the right temporal region is felt to be artifactual in nature. R CBF<30% :0 Tmax >6s: 4 mL     No acute intracranial process. Moderate chronic microvascular ischemic change and mild cerebral atrophy. Mild atherosclerotic changes at the origin of the right ICA. There is no major vessel occlusion. Small thyroid hypodensities require no imaging follow-up. There is no hemodynamically significant stenosis, aneurysm or dissection identified. Multilevel severe canal and foraminal stenoses of the cervical spine.  .      Chronic diagnoses   Problem List as of 10/16/2022 Date Reviewed: 2/23/2021            Codes Class Noted - Resolved    * (Principal) Ataxia ICD-10-CM: R27.0  ICD-9-CM: 781.3  10/15/2022 - Present        Hypertensive emergency ICD-10-CM: I16.1  ICD-9-CM: 401.9  10/15/2022 - Present        Basal cell carcinoma (BCC) of skin of neck ICD-10-CM: C44.41  ICD-9-CM: 173.41  1/12/2021 - Present        Suture granuloma ICD-10-CM: T81.89XA  ICD-9-CM: 998.89  1/12/2021 - Present        Thyroid nodule ICD-10-CM: E04.1  ICD-9-CM: 241.0  9/2/2020 - Present        Suspected cerebrovascular accident (CVA) ICD-10-CM: R09.89  ICD-9-CM: 785.9  9/2/2020 - Present        Hypertensive urgency ICD-10-CM: I16.0  ICD-9-CM: 401.9  9/1/2020 - Present        Suspected cerebrovascular accident ICD-10-CM: R09.89  ICD-9-CM: 785.9  9/1/2020 - Present        Bilateral pseudophakia ICD-10-CM: Z96.1  ICD-9-CM: V43.1  9/11/2017 - Present        Primary open angle glaucoma of both eyes, moderate stage ICD-10-CM: U81.6321  ICD-9-CM: 365.11, 365.72  9/11/2017 - Present        Vitamin D deficiency (Chronic) ICD-10-CM: E55.9  ICD-9-CM: 268.9  7/15/2010 - Present        Elevated blood pressure reading ICD-10-CM: R03.0  ICD-9-CM: 796.2  7/15/2010 - Present    Overview Signed 10/18/2010 11:31 AM by Bobby Denton MD     White coat Sx. Normal outside office. Insomnia (Chronic) ICD-10-CM: G47.00  ICD-9-CM: 780.52  7/15/2010 - Present        Osteopenia (Chronic) ICD-10-CM: M85.80  ICD-9-CM: 733.90  Unknown - Present        Hypercholesterolemia (Chronic) ICD-10-CM: E78.00  ICD-9-CM: 272.0  Unknown - Present        Rectal polyp (Chronic) ICD-10-CM: K62.1  ICD-9-CM: 569.0  Unknown - Present    Overview Signed 10/18/2010 11:30 AM by Bobby Denton MD     2008, Dr. Kathya Polk. DDD (degenerative disc disease) (Chronic) ICD-10-CM: [id number]  ICD-9-CM: 722.6  Unknown - Present        FH: leukemia ICD-10-CM: Z80.6  ICD-9-CM: V16.6  Unknown - Present        Duodenal ulcer - 1976 ICD-10-CM: 532  ICD-9-CM: 102  Unknown - Present        Subarachnoid hemorrhage - 1983 ICD-10-CM: I60.9  ICD-9-CM: 598  Unknown - Present        RESOLVED: Headache(784.0) (Chronic) ICD-10-CM: R51  ICD-9-CM: 784.0  Unknown - 9/2/2020           Current Discharge Medication List        CONTINUE these medications which have NOT CHANGED    Details   cholecalciferol, vitamin D3, 50 mcg (2,000 unit) tab Take 1 Tab by mouth daily.       timolol (TIMOPTIC) 0.5 % ophthalmic solution Administer 1 Drop to right eye daily. Diet:  Regular diet.     Activity:  As tolerated     Disposition: Home or Self Care    Discharge instructions to patient/family  Please seek medical attention for any new or worsening symptoms particularly fever, chest pain, shortness of breath, abdominal pain, nausea, vomiting    Follow up plans/appointments  - With PCP in 1-2 weeks    Maliha Weems DO  Family Medicine Resident     For Billing    Chief Complaint   Patient presents with    Chest Pain    Headache       Hospital Problems  Date Reviewed: 2/23/2021            Codes Class Noted POA    * (Principal) Ataxia ICD-10-CM: R27.0  ICD-9-CM: 781.3  10/15/2022 Unknown        Hypertensive emergency ICD-10-CM: I16.1  ICD-9-CM: 401.9  10/15/2022 Unknown

## 2022-10-16 NOTE — PROGRESS NOTES
Received via stretcher from ED, Code Stroke. NIH 0. Assisted to bed. Denies any pain. VSS. See admission assessment and interventions. Pt lives alone and son is nearby, checks on her regularly. She states an on-going decreased appetite, weakness, and dizziness when standing for \"months\".

## 2022-10-16 NOTE — PROGRESS NOTES
PHYSICAL THERAPY EVALUATION/DISCHARGE  Patient: Latrell Viramontes (70 y.o. female)  Date: 10/16/2022  Primary Diagnosis: Ataxia [R27.0]  Hypertensive emergency [I16.1]       Precautions: universal         ASSESSMENT  Based on the objective data described below, the patient presents with modified independent with ambulation with and without assistive device and independent with all functional mobility. Patient lives alone son lives next door and check on her multiple times. Occasionally use single point in the house and outside but has a rolling walker if needed. Recommend using rolling walker for now since this is first time to get up. Patient steady without assistive device in the room and use rolling walker out on the hallway. Reviewed all safety precaution and home exercise program with the patient, verbalized understanding, clear to go home per Physical Therapy perspective. Skilled physical therapy is not indicated at this time. .    Functional Outcome Measure: The patient scored 90/100 on the barthel index outcome measure . Other factors to consider for discharge: fall     Further skilled acute physical therapy is not indicated at this time. PLAN :  Recommendation for discharge: (in order for the patient to meet his/her long term goals)  No skilled physical therapy/ follow up rehabilitation needs identified at this time. This discharge recommendation:  Has been made in collaboration with the attending provider and/or case management    IF patient discharges home will need the following DME: patient owns DME required for discharge       SUBJECTIVE:   Patient stated I feel fine now .     OBJECTIVE DATA SUMMARY:   HISTORY:    Past Medical History:   Diagnosis Date    DDD (degenerative disc disease)     Duodenal ulcer 1976    FH: leukemia     Headache(784.0)     Hypercholesterolemia     Osteopenia     Osteoporosis     Rectal polyp     Subarachnoid hemorrhage ([facility] [address] 1983     Past Surgical History: Procedure Laterality Date    HX CYSTOCELE REPAIR      HX HYSTERECTOMY      2/2 fibroids    HX MOHS PROCEDURES      HX ORTHOPAEDIC      left foot surgery    HX OTHER SURGICAL  07/15/2020    Glaucoma surgery    HX UROLOGICAL      bladder surgery    [place] REPAIR OF RECTOCELE         Prior level of function: occasionally use single point cane in the house and out on the community  Personal factors and/or comorbidities impacting plan of care:     Home Situation  Home Environment: Private residence  # Steps to Enter: 2  Rails to Enter: Yes  Hand Rails : Bilateral  One/Two Story Residence: One story  Living Alone: Yes  Support Systems: Child(salima), Other Family Member(s), Friend/Neighbor  Patient Expects to be Discharged to[de-identified] Home with family assistance  Current DME Used/Available at Home: Cane, straight, Grab bars, Walker, rolling  Tub or Shower Type: Tub/Shower combination    EXAMINATION/PRESENTATION/DECISION MAKING:   Critical Behavior:  Neurologic State: Alert  Orientation Level: Oriented X4  Cognition: Appropriate decision making, Follows commands     Hearing: Auditory  Auditory Impairment: Hard of hearing, bilateral    Range Of Motion:  AROM: Within functional limits           PROM: Within functional limits           Strength:    Strength: Within functional limits                    Tone & Sensation:                                  Coordination:  Coordination: Within functional limits  Vision:      Functional Mobility:  Bed Mobility:  Rolling: Independent  Supine to Sit: Independent  Sit to Supine: Independent  Scooting: Independent  Transfers:  Sit to Stand: Modified independent  Stand to Sit: Modified independent  Stand Pivot Transfers: Modified independent     Bed to Chair: Modified independent              Balance:   Sitting: Intact  Standing: Intact; With support; Without support  Ambulation/Gait Training:  Distance (ft): 150 Feet (ft)  Assistive Device: Gait belt;Walker, rolling  Ambulation - Level of Assistance: Modified independent     Gait Description (WDL): Within defined limits           Base of Support: Widened     Speed/Eli: Pace decreased (<100 feet/min)                    Therapeutic Exercises:   Educate and instructed patient to continue active range of motion exercise on both legs while up on chair or on bed multiple times. Recommend patient to be up on the chair at least 3 times a day every meal times as tolerated. Functional Measure:  Barthel Index:    Bathin  Bladder: 10  Bowels: 10  Groomin  Dressing: 10  Feeding: 10  Mobility: 15  Stairs: 0  Toilet Use: 10  Transfer (Bed to Chair and Back): 15  Total: 90/100       The Barthel ADL Index: Guidelines  1. The index should be used as a record of what a patient does, not as a record of what a patient could do. 2. The main aim is to establish degree of independence from any help, physical or verbal, however minor and for whatever reason. 3. The need for supervision renders the patient not independent. 4. A patient's performance should be established using the best available evidence. Asking the patient, friends/relatives and nurses are the usual sources, but direct observation and common sense are also important. However direct testing is not needed. 5. Usually the patient's performance over the preceding 24-48 hours is important, but occasionally longer periods will be relevant. 6. Middle categories imply that the patient supplies over 50 per cent of the effort. 7. Use of aids to be independent is allowed. Score Interpretation (from [address] [facility] [address])    Independent   60-79 Minimally independent   40-59 Partially dependent   20-39 Very dependent   <20 Totally dependent     -Radha Robledo., Barthel, D.W. (1965). Functional evaluation: the Barthel Index. [facility] ([address] (1997). The Barthel activities of daily living index: self-reporting versus actual performance in the old (> or = 75 years).  Journal of American Geriatric Society 45(7), [address] [facility], SHELIA, Valeria Irvin., Chema Valente. (). Measuring the change in disability after inpatient rehabilitation; comparison of the responsiveness of the Barthel Index and Functional [place] Measure. Journal of Neurology, Neurosurgery, and Psychiatry, 66(4), 866-901. MAX Jara, EVANGELINA Stinson, & Praveen España M.A. (2004) Assessment of post-stroke quality of life in cost-effectiveness studies: The usefulness of the Barthel Index and the EuroQoL-5D. Quality of Life Research, 15, 358-18           Physical Therapy Evaluation Charge Determination   History Examination Presentation Decision-Making   LOW Complexity : Zero comorbidities / personal factors that will impact the outcome / POC LOW Complexity : 1-2 Standardized tests and measures addressing body structure, function, activity limitation and / or participation in recreation  LOW Complexity : Stable, uncomplicated  Other outcome measures barthel index  LOW       Based on the above components, the patient evaluation is determined to be of the following complexity level: LOW     Pain Ratin/10    Activity Tolerance:   Good      After treatment patient left in no apparent distress:   Sitting in chair, Heels elevated for pressure relief, Call bell within reach, and Bed / chair alarm activated    COMMUNICATION/EDUCATION:   The patients plan of care was discussed with: Occupational therapist and Registered nurse. Fall prevention education was provided and the patient/caregiver indicated understanding. Thank you for this referral.  Kaye Emerson, PT,WCC.    Time Calculation: 28 mins

## 2022-10-16 NOTE — DISCHARGE INSTRUCTIONS
HOME DISCHARGE INSTRUCTIONS    Breanna Connolly / [id number] : 1930    Admission date: 10/15/2022 Discharge date: 10/16/2022 12:17 PM     Please bring this form with you to show your care provider at your follow-up appointment. Primary care provider:  Heydi Guaman MD    Discharging provider:  Sara Weldon DO  - Family Medicine Resident  Cielo Kate MD - Family Medicine Attending      You have been admitted to the hospital with the following diagnoses:    ACUTE DIAGNOSES:  Ataxia [R27.0]  Hypertensive emergency [I16.1]    You were admitted with concern for a stroke. We did imaging of your head that showed you did not have a stroke. Your blood pressure has been high during hospitalization. You did not require any blood pressure medication. Please continue to check your blood pressures at home. Keep a log measuring your blood pressure each morning and each night when you are at rest.    MEDICATION CHANGES  START  - Vitamin D 2,000IU daily    STOP  - You do NOT need to continue to take the baby aspirin or the lipitor that we started here in the hospital      No other changes were made to your medications, please take all your other home medicines as previously prescribed. . . . . . . . . . . . . . . . . . . . . . . . . . . . . . . . . . . . . . . . . . . . . . . . . . . . . . . . . . . . . . . . . . . . . . . . Jolie Smith FOLLOW-UP CARE RECOMMENDATIONS:  - Assess for prn anxiety medication. Blood pressures seem to be quite elevated in clinical settings and with increased anxiety  - Prediabetes with A1c 6.2: Encourage lifestyle modification    Appointments         Follow-up tests needed: ***    Pending test results: At the time of your discharge the following test results are still pending: None. Please make sure you review these results with your outpatient follow-up provider(s).     DIET/what to eat:  Regular Diet    ACTIVITY:  Activity as tolerated    Specific symptoms to watch for: chest pain, shortness of breath, fever, chills, nausea, vomiting, diarrhea, change in mentation, falling, weakness, bleeding. What to do if new or unexpected symptoms occur? If you experience any of the above symptoms (or should other concerns or questions arise after discharge) please call your primary care physician. Return to the emergency room if you cannot get hold of your doctor. It is very important that you keep your follow-up appointment(s). Please bring discharge papers, medication list (and/or medication bottles) to your follow-up appointments for review by your outpatient provider(s). Please check the list of medications and be sure it includes every medication (even non-prescription medications) that your provider wants you to take. It is important that you take the medication exactly as they are prescribed. Keep your medication in the bottles provided by the pharmacist and keep a list of the medication names, dosages, and times to be taken in your wallet. Do not take other medications without consulting your doctor. If you have any questions about your medications or other instructions, please talk to your nurse or care provider before you leave the hospital.     Information obtained by:     I understand that if any problems occur once I am at home I am to contact my physician. These instructions were explained to me and I had the opportunity to ask questions. I understand and acknowledge receipt of the instructions indicated above.                                                                                                                                                Physician's or R.N.'s Signature                                                                  Date/Time                                                                                                                                              Patient or Representative Signature Date/Time

## 2022-10-16 NOTE — PROGRESS NOTES
Reason for Admission:  Ataxia, hypertensive emergency  Patient has a past medical history of Emphysema, open-angle glaucoma, HLD, osteopenia, anxiety, and dental carries. RUR Score:    11%                 Plan for utilizing home health:     to be determined     PCP: First and Last name:  Evelyn Gallardo MD     Name of Practice:    Are you a current patient: Yes/No: yes   Approximate date of last visit: August 2022   Can you participate in a virtual visit with your PCP:   yes     Pt has had difficulty getting an appointment with [facility] in [place] and went to [facility] to be seen. Current Advanced Directive/Advance Care Plan: Full Code  None; her son is her next of kin. Healthcare Decision Maker:   Click here to complete [address] including selection of the Healthcare Decision Maker Relationship (ie \"Primary\")                             Transition of Care Plan:                    Met with pt for d/c planning  Pt stated she lives by herself in a 1 story house with 3 entry steps. She stated she is independent with ADL's, has limited herself to driving shorter distances and is ambulatory. She has a straight cane, RW and a commode riser. Her son lives next to her. Medications are from [facility] in [place]. PT/OT consults  Neurology consult  CM following for any d/c needs  Pt's son to transport her home at d/c    Care Management Interventions  PCP Verified by CM: Yes  Mode of Transport at Discharge: Other (see comment)  Transition of Care Consult (CM Consult): Discharge Planning  Physical Therapy Consult: Yes  Occupational Therapy Consult: Yes  Support Systems: Child(salima), Other Family Member(s), Friend/Neighbor  Confirm Follow Up Transport: Family  Discharge Location  Patient Expects to be Discharged to[de-identified] Home with family assistance  MAHESH Redmond

## 2022-10-16 NOTE — PROGRESS NOTES
Bedside and Verbal shift change report given to RUDI Barraza (oncoming nurse) by [address] [place] [address] Street (offgoing nurse). Report included the following information SBAR, Kardex, ED Summary, MAR, Recent Results, and Cardiac Rhythm SR . This patient was assisted with Intentional Toileting every 2 hours during this shift as appropriate. Documentation of ambulation and output reflected on Flowsheet as appropriate. Purposeful hourly rounding was completed using AIDET and 5Ps. Outcomes of PHR documented as they occurred. Bed alarm in use as appropriate. Dual Suction and ambubag in place. I have reviewed discharge instructions with the patient and son. The patient and son verbalized understanding.

## 2022-10-16 NOTE — PROGRESS NOTES
OCCUPATIONAL THERAPY EVALUATION/DISCHARGE  Patient: Rogers Chavez (25 y.o. female)  Date: 10/16/2022  Primary Diagnosis: Ataxia [R27.0]  Hypertensive emergency [I16.1]       Precautions:        ASSESSMENT  Based on the objective data described below, the patient presents with good ADL performance and mildly impaired dynamic standing balance following admission for chest pain, HA, SOB, HTN, and gait instability. MRI negative for acute infarct and pt cleared to participate with therapy. Pt today is pleasant, alert, oriented, and offers excellent efforts for presented tasks. She demonstrates full intact and equal UE ROM, coordination, and strength and completes presented ADLs with overall MOD I. She reports unsteadiness upon initially standing up due to being in bed and is notably more confident using RW and demonstrates no LOB during standing portions of tasks. Pt has baseline visual deficits in L eye from h/o glaucoma and resulting surgeries and demonstrates appropriate compensation to manage her environment. No further skilled OT services indicated at this time. Current Level of Function (ADLs/self-care): overall MOD I    Functional Outcome Measure: The patient scored Total A-D  Total A-D (Motor Function): 66/66 on the Fugl-Odell Assessment which is indicative of no impairment in upper extremity functional status. Other factors to consider for discharge: lives alone; highly active and independent PLOF; drives; advanced age     PLAN :  Recommendation for discharge: (in order for the patient to meet his/her long term goals)  No skilled occupational therapy/ follow up rehabilitation needs identified at this time.     This discharge recommendation:  Has been made in collaboration with the attending provider and/or case management    IF patient discharges home will need the following DME:patient owns DME required for discharge - she reports having RW at home       SUBJECTIVE:   Patient stated I don't like to be idle, I have to keep moving.     OBJECTIVE DATA SUMMARY:   HISTORY:   Past Medical History:   Diagnosis Date    DDD (degenerative disc disease)     Duodenal ulcer 1976    FH: leukemia     Headache(784.0)     Hypercholesterolemia     Osteopenia     Osteoporosis     Rectal polyp     Subarachnoid hemorrhage ([facility] [address] 1983     Past Surgical History:   Procedure Laterality Date    HX CYSTOCELE REPAIR      HX HYSTERECTOMY      2/2 fibroids    HX MOHS PROCEDURES      HX ORTHOPAEDIC      left foot surgery    HX OTHER SURGICAL  07/15/2020    Glaucoma surgery    HX UROLOGICAL      bladder surgery    NE REPAIR OF RECTOCELE         Prior Level of Function/Environment/Context: MOD I with SPC; lives in her private home alone; was doing her own yard work until recently; drives  Expanded or extensive additional review of patient history:     Home Situation  Home Environment: Private residence  # Steps to Enter: 2  Rails to Enter: Yes  Hand Rails : Bilateral  One/Two Story Residence: One story  Living Alone: Yes  Support Systems: Child(salima), Other Family Member(s), Friend/Neighbor  Patient Expects to be Discharged to[de-identified] Home with family assistance  Current DME Used/Available at Home: Cane, straight, Grab bars, Walker, rolling  Tub or Shower Type: Tub/Shower combination    Hand dominance: Left    EXAMINATION OF PERFORMANCE DEFICITS:  Cognitive/Behavioral Status:  Neurologic State: Alert  Orientation Level: Oriented X4  Cognition: Appropriate decision making; Appropriate for age attention/concentration; Appropriate safety awareness; Follows commands  Perception: Appears intact  Perseveration: No perseveration noted  Safety/Judgement: Awareness of environment; Fall prevention;Home safety; Insight into deficits;Good awareness of safety precautions    Hearing: Auditory  Auditory Impairment: Hard of hearing, bilateral    Vision/Perceptual:    Tracking: Able to track left of midline; Able to track right of midline    Visual Fields: Difficulty detecting stimulus  in left lateral quadrant (baseline after recent surgery for glaucoma)  Diplopia: No    Acuity:  (impaired vision in L eye - baseline from h/o glaucoma and recent surgery ~2 years ago)      Range of Motion:  AROM: Within functional limits  PROM: Within functional limits    Strength:  Strength: Within functional limits    Coordination:  Coordination: Within functional limits  Fine Motor Skills-Upper: Left Intact; Right Intact    Gross Motor Skills-Upper: Left Intact; Right Intact    Balance:  Sitting: Intact  Standing: Intact; With support; Without support    Functional Mobility and Transfers for ADLs:  Bed Mobility:  Rolling: Independent  Supine to Sit: Independent  Sit to Supine: Independent  Scooting: Independent    Transfers:  Sit to Stand: Modified independent  Stand to Sit: Modified independent  Bed to Chair: Modified independent  Toilet Transfer : Modified independent    ADL Assessment:  Feeding: Independent    Oral Facial Hygiene/Grooming: Modified Independent    Bathing: Supervision;Stand-by assistance    Upper Body Dressing: Independent    Lower Body Dressing: Modified independent    Toileting: Modified independent    ADL Intervention and task modifications:  Cognitive Retraining  Safety/Judgement: Awareness of environment; Fall prevention;Home safety; Insight into deficits;Good awareness of safety precautions    Functional Measure:  Fugl-Odell Assessment of Motor Recovery after Stroke:      Reflex Activity  Flexors/Biceps/Fingers: Can be elicited  Extensors/Triceps: Can be elicited  Reflex Subtotal: 4    Volitional Movement Within Synergies  Shoulder Retraction: Full  Shoulder Elevation: Full  Shoulder Abduction (90 degrees): Full  Shoulder External Rotation: Full  Elbow Flexion: Full  Forearm Supination: Full  Shoulder Adduction/Internal Rotation: Full  Elbow Extension: Full  Forearm Pronation: Full  Subtotal: 18    Volitional Movement Mixing Synergies  Hand to Lumbar Spine: Full  Shoulder Flexion (0-90 degrees): Full  Pronation-Supination: Full  Subtotal: 6    Volitional Movement With Little or No Synergy  Shoulder Abduction (0-90 degrees): Full  Shoulder Flexion ( degrees): Full  Pronation/Supination: Full  Subtotal : 6    Normal Reflex Activity  Biceps, Triceps, Finger Flexors: Full  Subtotal : 2    Upper Extremity Total   Upper Extremity Total: 36    Wrist  Stability at 15 Degree Dorsiflexion: Full  Repeated Dorsiflexion/ Volar Flexion: Full  Stability at 15 Degree Dorsiflexion: Full  Repeated Dorsiflexion/ Volar Flexion: Full  Circumduction: Full  Wrist Total: 10    Hand  Mass Flexion: Full  Mass Extension: Full  Grasp A: Full  Grasp B: Full  Grasp C: Full  Grasp D: Full  Grasp E: Full  Hand Total: 14    Coordination/Speed  Tremor: None  Dysmetria: None  Time: <1s  Coordination/Speed Total : 6    Total A-D  Total A-D (Motor Function): 66/66     Barthel Index:  Bathin  Bladder: 10  Bowels: 10  Groomin  Dressing: 10  Feeding: 10  Mobility: 10  Stairs: 0  Toilet Use: 10  Transfer (Bed to Chair and Back): 15  Total: 80/100      Occupational Therapy Evaluation Charge Determination   History Examination Decision-Making   LOW Complexity : Brief history review  LOW Complexity : 1-3 performance deficits relating to physical, cognitive , or psychosocial skils that result in activity limitations and / or participation restrictions  MEDIUM Complexity : Patient may present with comorbidities that affect occupational performnce.  Miniml to moderate modification of tasks or assistance (eg, physical or verbal ) with assesment(s) is necessary to enable patient to complete evaluation       Based on the above components, the patient evaluation is determined to be of the following complexity level: LOW   Pain Rating:  Pt reporting general stiffness from being in bed    Activity Tolerance:   Good, tolerates ADLs without rest breaks, and SpO2 stable on RA    After treatment patient left in no apparent distress:    Sitting in chair, Call bell within reach, and Bed / chair alarm activated    COMMUNICATION/EDUCATION:   The patients plan of care was discussed with: Physical therapist and Registered nurse.      Thank you for this referral.  Raquel Tyler OT  Time Calculation: 26 mins

## 2022-10-16 NOTE — PROGRESS NOTES
Stroke Education provided to patient and the following topics were discussed    1. Patients personal risk factors for stroke are hypertension    2. Warning signs of Stroke:        * Sudden numbness or weakness of the face, arm or leg, especially on one side of          The body            * Sudden confusion, trouble speaking or understanding        * Sudden trouble seeing in one or both eyes        * Sudden trouble walking, dizziness, loss of balance or coordination        * Sudden severe headache with no known cause      3. Importance of activation Emergency Medical Services ( 9-1-1 ) immediately if experience any warning signs of stroke. 4. Be sure and schedule a follow-up appointment with your primary care doctor or any specialists as instructed. 5. You must take medicine every day to treat your risk factors for stroke. Be sure to take your medicines exactly as your doctor tells you: no more, no less. Know what your medicines are for , what they do. Anti-thrombotics /anticoagulants can help prevent strokes. You are taking the following medicine(s) aspirin    6. Smoking and second-hand smoke greatly increase your risk of stroke, cardiovascular disease and death. Smoking history never    7. Information provided was Stroke handout    8. Documentation of teaching completed in Patient Education Activity and on Care Plan with teaching response noted?   yes

## 2022-10-16 NOTE — CONSULTS
NEUROLOGY CONSULT NOTE    Patient ID:  Dunia Beach  [id number]  43 y.o.  12/8/1930    Date of Consultation:  October 16, 2022    Referring Physician: Sang Berry MD    Reason for Consultation:  lightheadedness and gait issues    History of Present Illness:     Patient Active Problem List    Diagnosis Date Noted    Ataxia 10/15/2022    Hypertensive emergency 10/15/2022    Basal cell carcinoma (BCC) of skin of neck 01/12/2021    Suture granuloma 01/12/2021    Thyroid nodule 09/02/2020    Suspected cerebrovascular accident (CVA) 09/02/2020    Hypertensive urgency 09/01/2020    Suspected cerebrovascular accident 09/01/2020    Bilateral pseudophakia 09/11/2017    Primary open angle glaucoma of both eyes, moderate stage 09/11/2017    Vitamin D deficiency 07/15/2010    Elevated blood pressure reading 07/15/2010    Insomnia 07/15/2010    Osteopenia     Hypercholesterolemia     Rectal polyp     DDD (degenerative disc disease)     FH: leukemia     Duodenal ulcer - 1976     Subarachnoid hemorrhage - 1983      Past Medical History:   Diagnosis Date    DDD (degenerative disc disease)     Duodenal ulcer 1976    FH: leukemia     Headache(784.0)     Hypercholesterolemia     Osteopenia     Osteoporosis     Rectal polyp     Subarachnoid hemorrhage ([address] 1983      Past Surgical History:   Procedure Laterality Date    HX CYSTOCELE REPAIR      HX HYSTERECTOMY      2/2 fibroids    HX MOHS PROCEDURES      HX ORTHOPAEDIC      left foot surgery    HX OTHER SURGICAL  07/15/2020    Glaucoma surgery    HX UROLOGICAL      bladder surgery    OH REPAIR OF RECTOCELE        Prior to Admission medications    Medication Sig Start Date End Date Taking? Authorizing Provider   cholecalciferol, vitamin D3, 50 mcg (2,000 unit) tab Take 1 Tab by mouth daily. Patient not taking: Reported on 10/16/2022    Provider, Historical   timolol (TIMOPTIC) 0.5 % ophthalmic solution Administer 1 Drop to right eye daily.   Patient not taking: Reported on 10/16/2022 6/8/10   Provider, Historical     No Known Allergies   Social History     Tobacco Use    Smoking status: Former     Packs/day: 0.50     Years: 20.00     Pack years: 10.00     Types: Cigarettes     Quit date: 7/15/1976     Years since quittin.2    Smokeless tobacco: Never   Substance Use Topics    Alcohol use: No      Family History   Problem Relation Age of Onset    Cancer Sister         leukemia    Cancer Brother         leukemia        Subjective:      Kane Blevins is a 80 y.o. female with history of subarachnoid hemorrhage (), emphysema, pulmonary fibrosis, osteoporosis, degenerative disc disease, duodenal ulcer, hyperlipidemia and headaches who was admitted from the ER for elevated blood pressure, generalized weakness/fatigue and shakiness. Patient reports intermittent episodes of lightheadedness for the past 3 to 4 weeks. Mainly occurs when she is physically active. It does not occur when she is sitting down or laying down or at rest.  She is also been having some episodes of dyspnea on exertion. Recently diagnosed with emphysema and started on Pulmicort. Blood pressures at home are usually normal per her recording. Patient was brought to the ER. In the ER blood pressure was 179/106. NIH stroke scale was 0. Labs revealed increased CO2 at 33, increased BUN at 22, decreased albumin at 3.3, elevated total cholesterol 200, elevated LDL at 119.4, slightly elevated hemoglobin A1c at 6.2, decreased vitamin D at 25.9. Unremarkable CBC, troponin, PT, INR, TSH, urinalysis. Head CT without contrast did not reveal any acute process. Moderate periventricular white matter disease. Generalized atrophy. Head and neck CTA with contrast did not reveal any acute intracranial process. Mild atherosclerotic changes at the origin of the right ICA. No flow-limiting stenosis or aneurysm. No significant ICA stenosis. Multilevel severe canal and foraminal stenosis of the cervical spine.   Brain MRI with and without contrast did not reveal any acute stroke. Periventricular white matter disease consistent with chronic microvascular ischemic changes. Chest x-ray did not reveal any acute process. When seen, patient still has some episodes of lightheadedness when active. Outside reports reviewed: ER records, radiology reports, lab reports. Review of Systems:    A comprehensive review of systems was done and were negative except for those mentioned in the HPI. Objective:     Patient Vitals for the past 8 hrs:   BP Temp Pulse Resp SpO2   10/16/22 0936 -- -- -- -- 94 %   10/16/22 0819 (!) 142/75 97.6 °F (36.4 °C) 76 15 93 %   10/16/22 0700 -- -- 67 -- --   10/16/22 0330 120/66 97.4 °F (36.3 °C) 72 16 94 %     PHYSICAL EXAM:    NEUROLOGICAL EXAM:    Appearance: The patient is well developed, well nourished, provides a coherent history and is in no acute distress. Mental Status: Oriented to time, place and person. Fluent, no aphasia or dysarthria. Good recent and remote memory. Good attention and concentration. Able to name objects. Good repetition. Adequate fund of knowledge. Mood and affect appropriate. Cranial Nerves:   Intact visual fields. CHAVEZ, EOM's full, no nystagmus, no ptosis. Facial sensation is normal. Corneal reflexes are intact. Facial movement is symmetric. Hearing is normal bilaterally. Palate is midline with normal elevation. Sternocleidomastoid and trapezius muscles are normal. Tongue is midline. Motor:  5/5 strength in upper and lower proximal and distal muscles. Normal bulk and tone. No fasciculations. No pronator drift. Reflexes:   Deep tendon reflexes 1+/4 and symmetrical. Downgoing toes. Sensory:   Normal to cold and vibration. Gait:  Steady gait. No Romberg. Tremor:   No tremor noted. Cerebellar:  Intact FTN/URSZULA/HTS. Neurovascular:  Normal heart sounds and regular rhythm, peripheral pulses intact, and no carotid bruits.      Arthritic deformities in both hands.    Imaging  CT Head, head/neck CTA, brain MRI with and without contrast: reviewed    Lab Review    Recent Results (from the past 24 hour(s))   CBC WITH AUTOMATED DIFF    Collection Time: 10/15/22 11:45 AM   Result Value Ref Range    WBC 9.0 3.6 - 11.0 K/uL    RBC 5.16 3.80 - 5.20 M/uL    HGB 15.1 11.5 - 16.0 g/dL    HCT 48.1 (H) 35.0 - 47.0 %    MCV 93.2 80.0 - 99.0 FL    MCH 29.3 26.0 - 34.0 PG    MCHC 31.4 30.0 - 36.5 g/dL    RDW 13.0 11.5 - 14.5 %    PLATELET 791 586 - 361 K/uL    MPV 10.9 8.9 - 12.9 FL    NRBC 0.0 0  WBC    ABSOLUTE NRBC 0.00 0.00 - 0.01 K/uL    NEUTROPHILS 71 32 - 75 %    LYMPHOCYTES 18 12 - 49 %    MONOCYTES 10 5 - 13 %    EOSINOPHILS 1 0 - 7 %    BASOPHILS 0 0 - 1 %    IMMATURE GRANULOCYTES 0 0.0 - 0.5 %    ABS. NEUTROPHILS 6.3 1.8 - 8.0 K/UL    ABS. LYMPHOCYTES 1.6 0.8 - 3.5 K/UL    ABS. MONOCYTES 0.9 0.0 - 1.0 K/UL    ABS. EOSINOPHILS 0.1 0.0 - 0.4 K/UL    ABS. BASOPHILS 0.0 0.0 - 0.1 K/UL    ABS. IMM. GRANS. 0.0 0.00 - 0.04 K/UL    DF AUTOMATED     METABOLIC PANEL, COMPREHENSIVE    Collection Time: 10/15/22 11:45 AM   Result Value Ref Range    Sodium 137 136 - 145 mmol/L    Potassium 3.5 3.5 - 5.1 mmol/L    Chloride 102 97 - 108 mmol/L    CO2 33 (H) 21 - 32 mmol/L    Anion gap 2 (L) 5 - 15 mmol/L    Glucose 115 (H) 65 - 100 mg/dL    BUN 22 (H) 6 - 20 MG/DL    Creatinine 0.88 0.55 - 1.02 MG/DL    BUN/Creatinine ratio 25 (H) 12 - 20      eGFR >60 >60 ml/min/1.73m2    Calcium 9.2 8.5 - 10.1 MG/DL    Bilirubin, total 0.5 0.2 - 1.0 MG/DL    ALT (SGPT) 17 12 - 78 U/L    AST (SGOT) 18 15 - 37 U/L    Alk.  phosphatase 100 45 - 117 U/L    Protein, total 7.8 6.4 - 8.2 g/dL    Albumin 3.3 (L) 3.5 - 5.0 g/dL    Globulin 4.5 (H) 2.0 - 4.0 g/dL    A-G Ratio 0.7 (L) 1.1 - 2.2     SAMPLES BEING HELD    Collection Time: 10/15/22 11:45 AM   Result Value Ref Range    SAMPLES BEING HELD 1RED,1SST,1BL     COMMENT        Add-on orders for these samples will be processed based on acceptable specimen integrity and analyte stability, which may vary by analyte. TROPONIN-HIGH SENSITIVITY    Collection Time: 10/15/22 11:45 AM   Result Value Ref Range    Troponin-High Sensitivity 6 0 - 51 ng/L   MAGNESIUM    Collection Time: 10/15/22 11:45 AM   Result Value Ref Range    Magnesium 2.5 (H) 1.6 - 2.4 mg/dL   URINALYSIS W/MICROSCOPIC    Collection Time: 10/15/22 11:45 AM   Result Value Ref Range    Color YELLOW/STRAW      Appearance CLEAR CLEAR      Specific gravity 1.008 1.003 - 1.030      pH (UA) 8.0 5.0 - 8.0      Protein Negative NEG mg/dL    Glucose Negative NEG mg/dL    Ketone Negative NEG mg/dL    Bilirubin Negative NEG      Blood TRACE (A) NEG      Urobilinogen 0.2 0.2 - 1.0 EU/dL    Nitrites Negative NEG      Leukocyte Esterase SMALL (A) NEG      WBC 5-10 0 - 4 /hpf    RBC 5-10 0 - 5 /hpf    Epithelial cells MODERATE (A) FEW /lpf    Bacteria Negative NEG /hpf   URINE CULTURE HOLD SAMPLE    Collection Time: 10/15/22 11:45 AM    Specimen: Serum; Urine   Result Value Ref Range    Urine culture hold        Urine on hold in Microbiology dept for 2 days. If unpreserved urine is submitted, it cannot be used for addtional testing after 24 hours, recollection will be required.    PROTHROMBIN TIME + INR    Collection Time: 10/15/22 11:45 AM   Result Value Ref Range    INR 1.0 0.9 - 1.1      Prothrombin time 10.4 9.0 - 11.1 sec   HEMOGLOBIN A1C WITH EAG    Collection Time: 10/15/22 11:45 AM   Result Value Ref Range    Hemoglobin A1c 6.2 (H) 4.0 - 5.6 %    Est. average glucose 131 mg/dL   GLUCOSE, POC    Collection Time: 10/15/22  2:30 PM   Result Value Ref Range    Glucose (POC) 87 65 - 117 mg/dL    Performed by Kemar Vaca    TROPONIN-HIGH SENSITIVITY    Collection Time: 10/15/22  2:33 PM   Result Value Ref Range    Troponin-High Sensitivity 6 0 - 51 ng/L   SAMPLES BEING HELD    Collection Time: 10/15/22  2:33 PM   Result Value Ref Range    SAMPLES BEING HELD  1SST     COMMENT        Add-on orders for these samples will be processed based on acceptable specimen integrity and analyte stability, which may vary by analyte.    TSH 3RD GENERATION    Collection Time: 10/15/22  2:33 PM   Result Value Ref Range    TSH 1.54 0.36 - 3.74 uIU/mL   VITAMIN D, 25 HYDROXY    Collection Time: 10/15/22  2:33 PM   Result Value Ref Range    Vitamin D 25-Hydroxy 25.9 (L) 30 - 100 ng/mL   LIPID PANEL    Collection Time: 10/15/22  6:23 PM   Result Value Ref Range    Cholesterol, total 200 (H) <200 MG/DL    Triglyceride 73 <150 MG/DL    HDL Cholesterol 66 MG/DL    LDL, calculated 119.4 (H) 0 - 100 MG/DL    VLDL, calculated 14.6 MG/DL    CHOL/HDL Ratio 3.0 0.0 - 5.0     URINALYSIS W/ REFLEX CULTURE    Collection Time: 10/15/22  6:23 PM    Specimen: Urine   Result Value Ref Range    Color YELLOW/STRAW      Appearance CLEAR CLEAR      Specific gravity 1.010 1.003 - 1.030      pH (UA) 8.0 5.0 - 8.0      Protein Negative NEG mg/dL    Glucose Negative NEG mg/dL    Ketone Negative NEG mg/dL    Bilirubin Negative NEG      Blood SMALL (A) NEG      Urobilinogen 0.2 0.2 - 1.0 EU/dL    Nitrites Negative NEG      Leukocyte Esterase Negative NEG      WBC 0-4 0 - 4 /hpf    RBC 0-5 0 - 5 /hpf    Epithelial cells FEW FEW /lpf    Bacteria Negative NEG /hpf    UA:UC IF INDICATED CULTURE NOT INDICATED BY UA RESULT CNI     CBC W/O DIFF    Collection Time: 10/16/22  5:42 AM   Result Value Ref Range    WBC 10.0 3.6 - 11.0 K/uL    RBC 4.83 3.80 - 5.20 M/uL    HGB 14.0 11.5 - 16.0 g/dL    HCT 44.4 35.0 - 47.0 %    MCV 91.9 80.0 - 99.0 FL    MCH 29.0 26.0 - 34.0 PG    MCHC 31.5 30.0 - 36.5 g/dL    RDW 13.1 11.5 - 14.5 %    PLATELET 647 138 - 184 K/uL    MPV 10.7 8.9 - 12.9 FL    NRBC 0.0 0  WBC    ABSOLUTE NRBC 0.00 0.00 - 7.98 K/uL   METABOLIC PANEL, BASIC    Collection Time: 10/16/22  5:42 AM   Result Value Ref Range    Sodium 136 136 - 145 mmol/L    Potassium 3.5 3.5 - 5.1 mmol/L    Chloride 104 97 - 108 mmol/L    CO2 29 21 - 32 mmol/L    Anion gap 3 (L) 5 - 15 mmol/L    Glucose 98 65 - 100 mg/dL    BUN 25 (H) 6 - 20 MG/DL    Creatinine 0.90 0.55 - 1.02 MG/DL    BUN/Creatinine ratio 28 (H) 12 - 20      eGFR >60 >60 ml/min/1.73m2    Calcium 9.0 8.5 - 10.1 MG/DL         Assessment:   Dizziness  Dyspnea on exertion  Hypertensive urgency    Plan:   Neurological examination is nonfocal.  There is no evidence of any brainstem or cerebellar deficits. Lightheadedness and issues with walking mainly occurring once patient is active is not consistent with a primarily neurological problem. Likely more peripheral in etiology or as a consequence of recent issues with emphysema especially in light of associated dyspnea on exertion. Patient recently started on inhalers which could also be a side effect. Head CT without contrast did not reveal any acute process. Moderate periventricular white matter disease. Brain MRI with and without contrast did not reveal any acute stroke. Periventricular white matter disease consistent with chronic microvascular ischemic changes. Head and neck CTA did not reveal any flow-limiting stenosis or aneurysm. No significant ICA stenosis. There is multilevel severe canal foraminal stenosis of the cervical spine which could be an issue in relation to her gait stability. If condition persist patient may benefit from evaluation by ENT or vestibular rehab. Advised continued home monitoring of her blood pressure. Continue care with pulmonologist for her recent diagnosis of emphysema. No further recommendations from a neurological standpoint. Thank you for the consult. This note was created using voice recognition software. Despite editing, there may be syntax errors.

## 2022-10-17 ENCOUNTER — PATIENT OUTREACH (OUTPATIENT)
Dept: CASE MANAGEMENT | Age: 87
End: 2022-10-17

## 2022-10-17 ENCOUNTER — TELEPHONE (OUTPATIENT)
Dept: FAMILY MEDICINE CLINIC | Age: 87
End: 2022-10-17

## 2022-10-17 LAB
ATRIAL RATE: 76 BPM
CALCULATED P AXIS, ECG09: 68 DEGREES
CALCULATED R AXIS, ECG10: 36 DEGREES
CALCULATED T AXIS, ECG11: 68 DEGREES
DIAGNOSIS, 93000: NORMAL
P-R INTERVAL, ECG05: 162 MS
Q-T INTERVAL, ECG07: 362 MS
QRS DURATION, ECG06: 86 MS
QTC CALCULATION (BEZET), ECG08: 407 MS
VENTRICULAR RATE, ECG03: 76 BPM

## 2022-10-17 RX ORDER — BUDESONIDE AND FORMOTEROL FUMARATE DIHYDRATE 160; 4.5 UG/1; UG/1
2 AEROSOL RESPIRATORY (INHALATION)
COMMUNITY

## 2022-10-17 NOTE — TELEPHONE ENCOUNTER
----- Message from Eliana Chaudhry sent at 10/17/2022  7:55 AM EDT -----  Regarding: FW: Hospital Follow Up Appt    ----- Message -----  From: Bianca Granado DO  Sent: 10/16/2022  12:47 PM EDT  To: Eliana Chaudhry  Subject: Hospital Follow Up Appt                          Hello! Could you please schedule this patient for hospital follow up at the earliest availability with Dr. Maribel Garvey?     Thank you very much,  Dalia Haskins

## 2022-10-17 NOTE — TELEPHONE ENCOUNTER
Called and spoke with pt   She will call back to schedule an appt  She wishes to get her tooth fixed prior to scheduling an appt

## 2022-10-17 NOTE — PROGRESS NOTES
Care Transitions Initial Call    Call within 2 business days of discharge: Yes     Patient: Siria Palmer Patient : 1930 MRN: [id number]    Last Discharge  Street       Date Complaint Diagnosis Description Type Department Provider    10/15/22 Chest Pain; Headache Ataxia . .. ED to Hosp-Admission (Discharged) (ADMIT) Rosalio Walsh, Natalia Veloz MD; Donovan Quigley .. Was this an external facility discharge? No Discharge Facility: [facility] 10/15-10/16    Challenges to be reviewed by the provider   Additional needs identified to be addressed with provider: yes  [facility] 10/15-10/16 Ataxia/Hypertensive urgency. BP in /127. Advised patient to check bp at home-if elevated, notify MD.  Needs YANELY with pcp asap. Method of communication with provider : chart routing    Discussed [phone] related testing which was not done at this time. Advance Care Planning:   Does patient have an Advance Directive: not on file, declined education. Inpatient Readmission Risk score: Unplanned Readmit Risk Score: 10.5    Was this a readmission? no   Patient stated reason for the admission: dizzy,unsteady on feet, h/a, bp elevated,weakness    Patients top risk factors for readmission: medical condition-Ataxia,Hypertensive emergency, h/o: glaucoma,emphysema,anxiety,SAH 1983,Dental carries. Interventions to address risk factors: Scheduled appointment with PCP-patient to call Jeanne Leslie if unable to get in with [facility] and Obtained and reviewed discharge summary and/or continuity of care documents    Care Transition Nurse (CTN) contacted the patient by telephone to perform post hospital discharge assessment. Verified name and  with patient as identifiers. Provided introduction to self, and explanation of the CTN role. Reports she feels well.  Slept well last pm. No dizziness or lightheadedness noted this am. Does have a tooth with a crack that needs to be extracted-plans to wait a few days before scheduling that procedure. Has bp cuff at home, has not checked her bp this am, just got up. Advised to check it and call back if concerns. Son stopped by this am to check on her, he is an  and in court today. Out of service area for [employer] [facility] [facility] CTN reviewed discharge instructions, medical action plan and red flags with patient who verbalized understanding. Were discharge instructions available to patient? yes. Reviewed appropriate site of care based on symptoms and resources available to patient including: PCP, Specialist, Urgent Care Clinics, When to call 911, and [email or web address] Mess[employer]. Patient given an opportunity to ask questions and does not have any further questions or concerns at this time. The patient agrees to contact the PCP office for questions related to their healthcare. Medication reconciliation was performed with patient, who verbalizes understanding of administration of home medications. Advised obtaining a 90-day supply of all daily and as-needed medications. Referral to Pharm D needed: no     Home Health/Outpatient orders at discharge: [address]: na  Date of initial visit: na    Durable Medical Equipment ordered at discharge: None  [address]: na  Durable Medical Equipment received: na    Was patient discharged with a pulse oximeter? no    Discussed follow-up appointments. If no appointment was previously scheduled, appointment scheduling offered: yes. Is follow up appointment scheduled within 7 days of discharge? Pending, she will call. Has had difficulty getting timely appt with [facility] so may go back to [facility]  . (Out of service area for [facility] follow up appointment(s): No future appointments. Non-[facility] follow up appointment(s): pcp- to be scheduled. Plan for follow-up call in 5-7 days based on severity of symptoms and risk factors.   Plan for next call: assess current symptoms, following discharge instructions, saw pcp for YANELY visit, taking meds as ordered. CTN provided contact information for future needs. Goals Addressed                   This Visit's Progress     Understands red flags post discharge. 10/17/22 [facility] 10/15-10/16 Ataxia/Hypertensive Emergency  Reviewed discharge instructions with patient using teach back. Reviewed meds, no changes made, using teach back. Reviewed red flags: changes in speech, walking,comprehension, elevated bp (> 150/90), sob,chest pain,weakness,dizziness,numbness especially on one side of body. YAENLY with pcp- to be scheduled. (Out of [facility] service area)  Given CTN contact info, if has questions/concerns.   CTN to check back in about a week,sooner [email or web address]

## 2022-10-25 ENCOUNTER — PATIENT OUTREACH (OUTPATIENT)
Dept: CASE MANAGEMENT | Age: 87
End: 2022-10-25

## 2022-10-25 NOTE — PROGRESS NOTES
Called and spoke to patient. Goals Addressed                   This Visit's Progress     Understands red flags post discharge. 10/17/22 [facility] 10/15-10/16 Ataxia/Hypertensive Emergency  Reviewed discharge instructions with patient using teach back. Reviewed meds, no changes made, using teach back. Reviewed red flags: changes in speech, walking,comprehension, elevated bp (> 150/90), sob,chest pain,weakness,dizziness,numbness especially on one side of body. YANELY with pcp- to be scheduled. (Out of [facility] service area)  Given CTN contact info, if has questions/concerns. CTN to check back in about a week,sooner prn.mbt  10/25/22  Reports she is doing well, but has a tooth that needs to be pulled. No pain as long as doesn't chew with it. Has appt on Thursday to see oral surgeon. Has not scheduled f/u with pcp yet, just not up to it with having the tooth problem. After tooth is extracted, will see pcp. /59 this am, later was 120/66. No dizziness, headache at times from the tooth. Doesn't sleep well on a regular basis, and doesn't take naps for fear it will make it harder to sleep at night. Not interested in anything to help her sleep. No red flags noted. Advised CTN to check back in about a week. mbt

## 2022-11-01 ENCOUNTER — PATIENT OUTREACH (OUTPATIENT)
Dept: CASE MANAGEMENT | Age: 87
End: 2022-11-01

## 2022-11-01 NOTE — PROGRESS NOTES
Called and spoke to patient. Goals Addressed                   This Visit's Progress     Understands red flags post discharge. 10/17/22 [facility] 10/15-10/16 Ataxia/Hypertensive Emergency  Reviewed discharge instructions with patient using teach back. Reviewed meds, no changes made, using teach back. Reviewed red flags: changes in speech, walking,comprehension, elevated bp (> 150/90), sob,chest pain,weakness,dizziness,numbness especially on one side of body. YANELY with pcp- to be scheduled. (Out of [facility] service area)  Given CTN contact info, if has questions/concerns. CTN to check back in about a week,sooner prn.mbt  10/25/22  Reports she is doing well, but has a tooth that needs to be pulled. No pain as long as doesn't chew with it. Has appt on Thursday to see oral surgeon. Has not scheduled f/u with pcp yet, just not up to it with having the tooth problem. After tooth is extracted, will see pcp. /59 this am, later was 120/66. No dizziness, headache at times from the tooth. Doesn't sleep well on a regular basis, and doesn't take naps for fear it will make it harder to sleep at night. Not interested in anything to help her sleep. No red flags noted. Advised CTN to check back in about a week. mbt    11/1/22  Patient reports she is doing well. Still waiting to get tooth pulled. Had appt last week at dentist for the evaluation. Will go Friday to have it removed. Says bp has been good, checks it q day. This am was 120/59. Has good appetite, staying hydrated. Uses cane when ambulates, feels more steady on her feet. Denies any dizziness, no sob. Advised continue current POC. CTN to check back in about a week. mbt

## 2022-11-11 ENCOUNTER — PATIENT OUTREACH (OUTPATIENT)
Dept: CASE MANAGEMENT | Age: 87
End: 2022-11-11

## 2022-11-11 NOTE — PROGRESS NOTES
Called and spoke to patient. Goals Addressed                   This Visit's Progress     Understands red flags post discharge. 10/17/22 [facility] 10/15-10/16 Ataxia/Hypertensive Emergency  Reviewed discharge instructions with patient using teach back. Reviewed meds, no changes made, using teach back. Reviewed red flags: changes in speech, walking,comprehension, elevated bp (> 150/90), sob,chest pain,weakness,dizziness,numbness especially on one side of body. YANELY with pcp- to be scheduled. (Out of [facility] service area)  Given CTN contact info, if has questions/concerns. CTN to check back in about a week,sooner prn.mbt  10/25/22  Reports she is doing well, but has a tooth that needs to be pulled. No pain as long as doesn't chew with it. Has appt on Thursday to see oral surgeon. Has not scheduled f/u with pcp yet, just not up to it with having the tooth problem. After tooth is extracted, will see pcp. /59 this am, later was 120/66. No dizziness, headache at times from the tooth. Doesn't sleep well on a regular basis, and doesn't take naps for fear it will make it harder to sleep at night. Not interested in anything to help her sleep. No red flags noted. Advised CTN to check back in about a week. mbt    11/1/22  Patient reports she is doing well. Still waiting to get tooth pulled. Had appt last week at dentist for the evaluation. Will go Friday to have it removed. Says bp has been good, checks it q day. This am was 120/59. Has good appetite, staying hydrated. Uses cane when ambulates, feels more steady on her feet. Denies any dizziness, no sob. Advised continue current POC. CTN to check back in about a week. mbt  11/11/22  Reports she is doing well. BP was too high to have tooth pulled. PCP saw her, added Metoprolol to help control her bp. She has been checking it at home. Runs great at home, has been told she has \"white coat syndrome\"  BP today at home, 104/59.   Advised to stay hydrated and notify pcp if bp drops below 90/60. Next appt at dentist is 11/23. CTN to check back in about a week. mbt

## 2022-11-15 ENCOUNTER — PATIENT OUTREACH (OUTPATIENT)
Dept: CASE MANAGEMENT | Age: 87
End: 2022-11-15

## 2022-11-15 NOTE — PROGRESS NOTES
Patient has graduated from the Transitions of Care Coordination  program on 11/15/22. Patient/family has the ability to self-manage at this time Care management goals have been completed. Patient was not [place] to [place] [facility] [place] further [place]. Goals Addressed                   This Visit's Progress     COMPLETED: Understands red flags post discharge. 10/17/22 [facility] 10/15-10/16 Ataxia/Hypertensive Emergency  Reviewed discharge instructions with patient using teach back. Reviewed meds, no changes made, using teach back. Reviewed red flags: changes in speech, walking,comprehension, elevated bp (> 150/90), sob,chest pain,weakness,dizziness,numbness especially on one side of body. YANELY with pcp- to be scheduled. (Out of [facility] service area)  Given CTN contact info, if has questions/concerns. CTN to check back in about a week,sooner [email or web address]  10/25/22  Reports she is doing well, but has a tooth that needs to be pulled. No pain as long as doesn't chew with it. Has appt on Thursday to see oral surgeon. Has not scheduled f/u with pcp yet, just not up to it with having the tooth problem. After tooth is extracted, will see pcp. /59 this am, later was 120/66. No dizziness, headache at times from the tooth. Doesn't sleep well on a regular basis, and doesn't take naps for fear it will make it harder to sleep at night. Not interested in anything to help her sleep. No red flags noted. Advised CTN to check back in about a week. mbt    11/1/22  Patient reports she is doing well. Still waiting to get tooth pulled. Had appt last week at dentist for the evaluation. Will go Friday to have it removed. Says bp has been good, checks it q day. This am was 120/59. Has good appetite, staying hydrated. Uses cane when ambulates, feels more steady on her feet. Denies any dizziness, no sob. Advised continue current POC. CTN to check back in about a week. mbt  11/11/22  Reports she is doing well.  BP was too high to have tooth pulled. PCP saw her, added Metoprolol to help control her bp. She has been checking it at home. Runs great at home, has been told she has \"white coat syndrome\"  BP today at home, 104/59. Advised to stay hydrated and notify pcp if bp drops below 90/60. Next appt at dentist is 11/23. CTN to check back in about a week. mbt  11/15/22  Patient reports she is doing quite well. No red flags noted. Still waiting to have tooth pulled. Appt is next Wednesday. Suggested she do some deep breathing exercises prior to calm her nerves so bp won't be up. Advised to continue current POC. Wished her well. YANELY closed at this time. mbt              Patient has Care Transition Nurse's contact information for any further questions, concerns, or needs. Patients upcoming visits:  No future appointments.

## 2023-12-13 NOTE — ED NOTES
Code stroke called @ [phone]    Tele neuro paged @[phone]     Dr. Ian Velasquez (Tele neuro spoke to ) @ 13:53     Tele neuro machine brought into room, dr. Ian Velasquez spoke to family [email or web address]     [address] in [address] @ [phone] no

## 2024-09-09 ENCOUNTER — APPOINTMENT (OUTPATIENT)
Facility: HOSPITAL | Age: 89
End: 2024-09-09
Payer: MEDICARE

## 2024-09-09 ENCOUNTER — HOSPITAL ENCOUNTER (EMERGENCY)
Facility: HOSPITAL | Age: 89
Discharge: HOME | End: 2024-09-10
Attending: EMERGENCY MEDICINE
Payer: MEDICARE

## 2024-09-09 DIAGNOSIS — R10.9 FLANK PAIN: Primary | ICD-10-CM

## 2024-09-09 LAB
ALBUMIN SERPL-MCNC: 3.8 G/DL (ref 3.5–5)
ALBUMIN/GLOB SERPL: 0.9 (ref 1.1–2.2)
ALP SERPL-CCNC: 137 U/L (ref 45–117)
ALT SERPL-CCNC: 15 U/L (ref 12–78)
ANION GAP SERPL CALC-SCNC: 12 MMOL/L (ref 2–12)
APPEARANCE UR: CLEAR
AST SERPL W P-5'-P-CCNC: 27 U/L (ref 15–37)
BACTERIA URNS QL MICRO: NEGATIVE /HPF
BASOPHILS # BLD: 0 K/UL (ref 0–0.1)
BASOPHILS NFR BLD: 0 % (ref 0–1)
BILIRUB SERPL-MCNC: 0.9 MG/DL (ref 0.2–1)
BILIRUB UR QL: NEGATIVE
BUN SERPL-MCNC: 22 MG/DL (ref 6–20)
BUN/CREAT SERPL: 26 (ref 12–20)
CA-I BLD-MCNC: 9.3 MG/DL (ref 8.5–10.1)
CHLORIDE SERPL-SCNC: 102 MMOL/L (ref 97–108)
CO2 SERPL-SCNC: 23 MMOL/L (ref 21–32)
COLOR UR: ABNORMAL
CREAT SERPL-MCNC: 0.85 MG/DL (ref 0.55–1.02)
DIFFERENTIAL METHOD BLD: ABNORMAL
EOSINOPHIL # BLD: 0.1 K/UL (ref 0–0.4)
EOSINOPHIL NFR BLD: 1 % (ref 0–7)
EPITH CASTS URNS QL MICRO: ABNORMAL /LPF
ERYTHROCYTE [DISTWIDTH] IN BLOOD BY AUTOMATED COUNT: 12.5 % (ref 11.5–14.5)
GLOBULIN SER CALC-MCNC: 4.3 G/DL (ref 2–4)
GLUCOSE SERPL-MCNC: 119 MG/DL (ref 65–100)
GLUCOSE UR STRIP.AUTO-MCNC: NEGATIVE MG/DL
HCT VFR BLD AUTO: 45.6 % (ref 35–47)
HGB BLD-MCNC: 15.2 G/DL (ref 11.5–16)
HGB UR QL STRIP: NEGATIVE
IMM GRANULOCYTES # BLD AUTO: 0.1 K/UL (ref 0–0.04)
IMM GRANULOCYTES NFR BLD AUTO: 0 % (ref 0–0.5)
KETONES UR QL STRIP.AUTO: 20 MG/DL
LEUKOCYTE ESTERASE UR QL STRIP.AUTO: ABNORMAL
LIPASE SERPL-CCNC: 23 U/L (ref 13–75)
LYMPHOCYTES # BLD: 2.2 K/UL (ref 0.8–3.5)
LYMPHOCYTES NFR BLD: 19 % (ref 12–49)
MCH RBC QN AUTO: 30.7 PG (ref 26–34)
MCHC RBC AUTO-ENTMCNC: 33.3 G/DL (ref 30–36.5)
MCV RBC AUTO: 92.1 FL (ref 80–99)
MONOCYTES # BLD: 1.5 K/UL (ref 0–1)
MONOCYTES NFR BLD: 13 % (ref 5–13)
MUCOUS THREADS URNS QL MICRO: ABNORMAL /LPF
NEUTS SEG # BLD: 7.7 K/UL (ref 1.8–8)
NEUTS SEG NFR BLD: 67 % (ref 32–75)
NITRITE UR QL STRIP.AUTO: NEGATIVE
NRBC # BLD: 0 K/UL (ref 0–0.01)
NRBC BLD-RTO: 0 PER 100 WBC
PH UR STRIP: 8 (ref 5–8)
PLATELET # BLD AUTO: 230 K/UL (ref 150–400)
PMV BLD AUTO: 11.4 FL (ref 8.9–12.9)
POTASSIUM SERPL-SCNC: 3.8 MMOL/L (ref 3.5–5.1)
PROT SERPL-MCNC: 8.1 G/DL (ref 6.4–8.2)
PROT UR STRIP-MCNC: 30 MG/DL
RBC # BLD AUTO: 4.95 M/UL (ref 3.8–5.2)
RBC #/AREA URNS HPF: ABNORMAL /HPF (ref 0–5)
SODIUM SERPL-SCNC: 137 MMOL/L (ref 136–145)
SP GR UR REFRACTOMETRY: 1.01 (ref 1–1.03)
TROPONIN I SERPL HS-MCNC: 6 NG/L (ref 0–51)
URINE CULTURE IF INDICATED: ABNORMAL
UROBILINOGEN UR QL STRIP.AUTO: 0.1 EU/DL (ref 0.1–1)
WBC # BLD AUTO: 11.6 K/UL (ref 3.6–11)
WBC URNS QL MICRO: ABNORMAL /HPF (ref 0–4)

## 2024-09-09 PROCEDURE — 81001 URINALYSIS AUTO W/SCOPE: CPT

## 2024-09-09 PROCEDURE — 74176 CT ABD & PELVIS W/O CONTRAST: CPT

## 2024-09-09 PROCEDURE — 36415 COLL VENOUS BLD VENIPUNCTURE: CPT

## 2024-09-09 PROCEDURE — 71046 X-RAY EXAM CHEST 2 VIEWS: CPT

## 2024-09-09 PROCEDURE — 83690 ASSAY OF LIPASE: CPT

## 2024-09-09 PROCEDURE — 2580000003 HC RX 258: Performed by: EMERGENCY MEDICINE

## 2024-09-09 PROCEDURE — 80053 COMPREHEN METABOLIC PANEL: CPT

## 2024-09-09 PROCEDURE — 96374 THER/PROPH/DIAG INJ IV PUSH: CPT

## 2024-09-09 PROCEDURE — 93005 ELECTROCARDIOGRAM TRACING: CPT | Performed by: EMERGENCY MEDICINE

## 2024-09-09 PROCEDURE — 85025 COMPLETE CBC W/AUTO DIFF WBC: CPT

## 2024-09-09 PROCEDURE — 71275 CT ANGIOGRAPHY CHEST: CPT

## 2024-09-09 PROCEDURE — 6360000002 HC RX W HCPCS: Performed by: EMERGENCY MEDICINE

## 2024-09-09 PROCEDURE — 84484 ASSAY OF TROPONIN QUANT: CPT

## 2024-09-09 PROCEDURE — 99285 EMERGENCY DEPT VISIT HI MDM: CPT

## 2024-09-09 PROCEDURE — 96375 TX/PRO/DX INJ NEW DRUG ADDON: CPT

## 2024-09-09 RX ORDER — MORPHINE SULFATE 2 MG/ML
2 INJECTION, SOLUTION INTRAMUSCULAR; INTRAVENOUS
Status: COMPLETED | OUTPATIENT
Start: 2024-09-09 | End: 2024-09-09

## 2024-09-09 RX ORDER — KETOROLAC TROMETHAMINE 15 MG/ML
15 INJECTION, SOLUTION INTRAMUSCULAR; INTRAVENOUS ONCE
Status: COMPLETED | OUTPATIENT
Start: 2024-09-09 | End: 2024-09-09

## 2024-09-09 RX ORDER — 0.9 % SODIUM CHLORIDE 0.9 %
1000 INTRAVENOUS SOLUTION INTRAVENOUS ONCE
Status: COMPLETED | OUTPATIENT
Start: 2024-09-09 | End: 2024-09-10

## 2024-09-09 RX ORDER — MORPHINE SULFATE 4 MG/ML
4 INJECTION, SOLUTION INTRAMUSCULAR; INTRAVENOUS
Status: DISCONTINUED | OUTPATIENT
Start: 2024-09-09 | End: 2024-09-10 | Stop reason: HOSPADM

## 2024-09-09 RX ADMIN — KETOROLAC TROMETHAMINE 15 MG: 15 INJECTION, SOLUTION INTRAMUSCULAR; INTRAVENOUS at 22:33

## 2024-09-09 RX ADMIN — SODIUM CHLORIDE 1000 ML: 9 INJECTION, SOLUTION INTRAVENOUS at 22:31

## 2024-09-09 RX ADMIN — MORPHINE SULFATE 2 MG: 2 INJECTION, SOLUTION INTRAMUSCULAR; INTRAVENOUS at 22:33

## 2024-09-09 ASSESSMENT — LIFESTYLE VARIABLES
HOW OFTEN DO YOU HAVE A DRINK CONTAINING ALCOHOL: NEVER
HOW MANY STANDARD DRINKS CONTAINING ALCOHOL DO YOU HAVE ON A TYPICAL DAY: PATIENT DOES NOT DRINK

## 2024-09-09 ASSESSMENT — PAIN - FUNCTIONAL ASSESSMENT: PAIN_FUNCTIONAL_ASSESSMENT: 0-10

## 2024-09-09 ASSESSMENT — PAIN SCALES - GENERAL: PAINLEVEL_OUTOF10: 5

## 2024-09-10 VITALS
OXYGEN SATURATION: 96 % | HEIGHT: 64 IN | TEMPERATURE: 96.8 F | SYSTOLIC BLOOD PRESSURE: 174 MMHG | RESPIRATION RATE: 12 BRPM | DIASTOLIC BLOOD PRESSURE: 91 MMHG | HEART RATE: 71 BPM | WEIGHT: 125 LBS | BODY MASS INDEX: 21.34 KG/M2

## 2024-09-10 LAB
EKG ATRIAL RATE: 80 BPM
EKG DIAGNOSIS: NORMAL
EKG P AXIS: 73 DEGREES
EKG P-R INTERVAL: 162 MS
EKG Q-T INTERVAL: 410 MS
EKG QRS DURATION: 88 MS
EKG QTC CALCULATION (BAZETT): 472 MS
EKG R AXIS: 19 DEGREES
EKG T AXIS: 67 DEGREES
EKG VENTRICULAR RATE: 80 BPM

## 2024-09-10 PROCEDURE — 6360000004 HC RX CONTRAST MEDICATION: Performed by: EMERGENCY MEDICINE

## 2024-09-10 RX ORDER — IOPAMIDOL 755 MG/ML
100 INJECTION, SOLUTION INTRAVASCULAR
Status: COMPLETED | OUTPATIENT
Start: 2024-09-10 | End: 2024-09-10

## 2024-09-10 RX ORDER — NAPROXEN 500 MG/1
500 TABLET ORAL 2 TIMES DAILY WITH MEALS
Qty: 6 TABLET | Refills: 0 | Status: SHIPPED | OUTPATIENT
Start: 2024-09-10 | End: 2024-09-10

## 2024-09-10 RX ORDER — NAPROXEN 500 MG/1
500 TABLET ORAL 2 TIMES DAILY WITH MEALS
Qty: 6 TABLET | Refills: 0 | Status: SHIPPED | OUTPATIENT
Start: 2024-09-10 | End: 2024-09-13

## 2024-09-10 RX ADMIN — IOPAMIDOL 100 ML: 755 INJECTION, SOLUTION INTRAVENOUS at 00:06

## 2024-09-21 ENCOUNTER — HOSPITAL ENCOUNTER (EMERGENCY)
Facility: HOSPITAL | Age: 89
Discharge: HOME | End: 2024-09-21
Attending: EMERGENCY MEDICINE
Payer: MEDICARE

## 2024-09-21 VITALS
OXYGEN SATURATION: 96 % | BODY MASS INDEX: 21.97 KG/M2 | WEIGHT: 128 LBS | SYSTOLIC BLOOD PRESSURE: 191 MMHG | RESPIRATION RATE: 14 BRPM | TEMPERATURE: 97.5 F | DIASTOLIC BLOOD PRESSURE: 89 MMHG | HEART RATE: 63 BPM

## 2024-09-21 DIAGNOSIS — M54.6 ACUTE LEFT-SIDED THORACIC BACK PAIN: Primary | ICD-10-CM

## 2024-09-21 PROCEDURE — 96375 TX/PRO/DX INJ NEW DRUG ADDON: CPT

## 2024-09-21 PROCEDURE — 6360000002 HC RX W HCPCS: Performed by: EMERGENCY MEDICINE

## 2024-09-21 PROCEDURE — 93005 ELECTROCARDIOGRAM TRACING: CPT | Performed by: EMERGENCY MEDICINE

## 2024-09-21 PROCEDURE — 96374 THER/PROPH/DIAG INJ IV PUSH: CPT

## 2024-09-21 PROCEDURE — 99284 EMERGENCY DEPT VISIT MOD MDM: CPT

## 2024-09-21 RX ORDER — CYCLOBENZAPRINE HCL 5 MG
5 TABLET ORAL 3 TIMES DAILY PRN
Qty: 15 TABLET | Refills: 0 | Status: SHIPPED | OUTPATIENT
Start: 2024-09-21 | End: 2024-09-26

## 2024-09-21 RX ORDER — KETOROLAC TROMETHAMINE 15 MG/ML
15 INJECTION, SOLUTION INTRAMUSCULAR; INTRAVENOUS ONCE
Status: COMPLETED | OUTPATIENT
Start: 2024-09-21 | End: 2024-09-21

## 2024-09-21 RX ORDER — HYDROCODONE BITARTRATE AND ACETAMINOPHEN 5; 325 MG/1; MG/1
1 TABLET ORAL EVERY 6 HOURS PRN
Qty: 12 TABLET | Refills: 0 | Status: SHIPPED | OUTPATIENT
Start: 2024-09-21 | End: 2024-09-24

## 2024-09-21 RX ORDER — MORPHINE SULFATE 2 MG/ML
2 INJECTION, SOLUTION INTRAMUSCULAR; INTRAVENOUS
Status: COMPLETED | OUTPATIENT
Start: 2024-09-21 | End: 2024-09-21

## 2024-09-21 RX ADMIN — MORPHINE SULFATE 2 MG: 2 INJECTION, SOLUTION INTRAMUSCULAR; INTRAVENOUS at 14:35

## 2024-09-21 RX ADMIN — KETOROLAC TROMETHAMINE 15 MG: 15 INJECTION, SOLUTION INTRAMUSCULAR; INTRAVENOUS at 14:35

## 2024-09-21 ASSESSMENT — PAIN - FUNCTIONAL ASSESSMENT
PAIN_FUNCTIONAL_ASSESSMENT: 0-10
PAIN_FUNCTIONAL_ASSESSMENT: ACTIVITIES ARE NOT PREVENTED

## 2024-09-21 ASSESSMENT — PAIN DESCRIPTION - LOCATION
LOCATION: BACK
LOCATION: BACK

## 2024-09-21 ASSESSMENT — PAIN SCALES - GENERAL
PAINLEVEL_OUTOF10: 0
PAINLEVEL_OUTOF10: 6
PAINLEVEL_OUTOF10: 0

## 2024-09-21 ASSESSMENT — PAIN DESCRIPTION - DESCRIPTORS: DESCRIPTORS: ACHING;DISCOMFORT

## 2024-09-21 ASSESSMENT — PAIN DESCRIPTION - ORIENTATION: ORIENTATION: UPPER

## 2024-09-22 LAB
EKG ATRIAL RATE: 77 BPM
EKG DIAGNOSIS: NORMAL
EKG P AXIS: 83 DEGREES
EKG P-R INTERVAL: 124 MS
EKG Q-T INTERVAL: 388 MS
EKG QRS DURATION: 82 MS
EKG QTC CALCULATION (BAZETT): 439 MS
EKG R AXIS: 14 DEGREES
EKG T AXIS: 62 DEGREES
EKG VENTRICULAR RATE: 77 BPM